# Patient Record
Sex: FEMALE | Race: WHITE
[De-identification: names, ages, dates, MRNs, and addresses within clinical notes are randomized per-mention and may not be internally consistent; named-entity substitution may affect disease eponyms.]

---

## 2019-12-04 ENCOUNTER — HOSPITAL ENCOUNTER (EMERGENCY)
Dept: HOSPITAL 75 - ER | Age: 24
Discharge: HOME | End: 2019-12-04
Payer: COMMERCIAL

## 2019-12-04 VITALS — WEIGHT: 293 LBS | BODY MASS INDEX: 43.4 KG/M2 | HEIGHT: 68.9 IN

## 2019-12-04 VITALS — DIASTOLIC BLOOD PRESSURE: 83 MMHG | SYSTOLIC BLOOD PRESSURE: 139 MMHG

## 2019-12-04 DIAGNOSIS — K59.00: ICD-10-CM

## 2019-12-04 DIAGNOSIS — K52.9: ICD-10-CM

## 2019-12-04 DIAGNOSIS — O16.1: ICD-10-CM

## 2019-12-04 DIAGNOSIS — Z87.891: ICD-10-CM

## 2019-12-04 DIAGNOSIS — Z77.22: ICD-10-CM

## 2019-12-04 DIAGNOSIS — O99.612: Primary | ICD-10-CM

## 2019-12-04 DIAGNOSIS — Z3A.14: ICD-10-CM

## 2019-12-04 LAB
ALBUMIN SERPL-MCNC: 3.7 GM/DL (ref 3.2–4.5)
ALP SERPL-CCNC: 56 U/L (ref 40–136)
ALT SERPL-CCNC: 18 U/L (ref 0–55)
BASOPHILS # BLD AUTO: 0 10^3/UL (ref 0–0.1)
BASOPHILS NFR BLD AUTO: 0 % (ref 0–10)
BILIRUB SERPL-MCNC: 0.3 MG/DL (ref 0.1–1)
BUN/CREAT SERPL: 6
CALCIUM SERPL-MCNC: 9.2 MG/DL (ref 8.5–10.1)
CHLORIDE SERPL-SCNC: 106 MMOL/L (ref 98–107)
CO2 SERPL-SCNC: 18 MMOL/L (ref 21–32)
CREAT SERPL-MCNC: 0.64 MG/DL (ref 0.6–1.3)
EOSINOPHIL # BLD AUTO: 0 10^3/UL (ref 0–0.3)
EOSINOPHIL NFR BLD AUTO: 0 % (ref 0–10)
ERYTHROCYTE [DISTWIDTH] IN BLOOD BY AUTOMATED COUNT: 13.9 % (ref 10–14.5)
GFR SERPLBLD BASED ON 1.73 SQ M-ARVRAT: > 60 ML/MIN
GLUCOSE SERPL-MCNC: 86 MG/DL (ref 70–105)
HCT VFR BLD CALC: 34 % (ref 35–52)
HGB BLD-MCNC: 11.4 G/DL (ref 11.5–16)
LIPASE SERPL-CCNC: 10 U/L (ref 8–78)
LYMPHOCYTES # BLD AUTO: 2 X 10^3 (ref 1–4)
LYMPHOCYTES NFR BLD AUTO: 23 % (ref 12–44)
MAGNESIUM SERPL-MCNC: 1.9 MG/DL (ref 1.6–2.4)
MANUAL DIFFERENTIAL PERFORMED BLD QL: NO
MCH RBC QN AUTO: 28 PG (ref 25–34)
MCHC RBC AUTO-ENTMCNC: 34 G/DL (ref 32–36)
MCV RBC AUTO: 81 FL (ref 80–99)
MONOCYTES # BLD AUTO: 0.7 X 10^3 (ref 0–1)
MONOCYTES NFR BLD AUTO: 7 % (ref 0–12)
NEUTROPHILS # BLD AUTO: 6.2 X 10^3 (ref 1.8–7.8)
NEUTROPHILS NFR BLD AUTO: 70 % (ref 42–75)
PLATELET # BLD: 275 10^3/UL (ref 130–400)
PMV BLD AUTO: 10.9 FL (ref 7.4–10.4)
POTASSIUM SERPL-SCNC: 3.8 MMOL/L (ref 3.6–5)
PROT SERPL-MCNC: 6.9 GM/DL (ref 6.4–8.2)
SODIUM SERPL-SCNC: 136 MMOL/L (ref 135–145)
WBC # BLD AUTO: 8.9 10^3/UL (ref 4.3–11)

## 2019-12-04 PROCEDURE — 85025 COMPLETE CBC W/AUTO DIFF WBC: CPT

## 2019-12-04 PROCEDURE — 80053 COMPREHEN METABOLIC PANEL: CPT

## 2019-12-04 PROCEDURE — 83735 ASSAY OF MAGNESIUM: CPT

## 2019-12-04 PROCEDURE — 36415 COLL VENOUS BLD VENIPUNCTURE: CPT

## 2019-12-04 PROCEDURE — 96374 THER/PROPH/DIAG INJ IV PUSH: CPT

## 2019-12-04 PROCEDURE — 96361 HYDRATE IV INFUSION ADD-ON: CPT

## 2019-12-04 PROCEDURE — 83690 ASSAY OF LIPASE: CPT

## 2019-12-04 NOTE — ED ABDOMINAL PAIN
General


Chief Complaint:  Abdominal/GI Problems


Stated Complaint:  CONSTIPATION,VOMITING, FEVER


Nursing Triage Note:  


SELVIN STATES THAT SHE HAS BEEN BEEN CONSTIPATED SINCE  THANKSGIVING. SHE IS 14




WEEKS PREGNANT. SHE HAS ALSO BEEN HAVING NAUSEA AND VOMITING AND FEVER .5.


Sepsis Screen:  No Definite Risk


Source of Information:  Patient, Other (BF)


Exam Limitations:  No Limitations





History of Present Illness


Date Seen by Provider:  Dec 4, 2019


Time Seen by Provider:  17:55


Initial Comments


Patient presents ER by private conveyance from Dr. Faulkner office with chief 

complaint she has not had a bowel movement since Thanksgiving, approximate 6 

days ago. For the past 3-4 days she's been having nausea with vomiting and 

yesterday she started expressing fever with a MAXIMUM TEMPERATURE of 101.5. She 

went to her OB provider Dr. Persaud and was given three quarters of a liter fluid 

and Zofran before being sent to the ER for further evaluation. She has no 

history of abdominal trauma, surgery or other medical problems besides 

hypertension for which she is usually on lisinopril but she was switched to the 

labetalol for the pregnancy. She is a G1 at 14 weeks 2 days. The Zofran did help

. She has no other medical allergies. She quit smoking about a month ago and 

does not use alcohol or recreational drugs. She's having no dysuria or blood in 

the vomitus. She has some upper abdominal pain associated with the vomiting. She

has not tried anything for the pain.





Allergies and Home Medications


Allergies


Coded Allergies:  


     No Known Drug Allergies (Unverified , 12/4/19)





Patient Home Medication List


Home Medication List Reviewed:  Yes





Review of Systems


Review of Systems


Constitutional:  No chills, No diaphoresis


EENTM:  No Blurred Vision, No Double Vision


Respiratory:  Denies Cough, Denies Shortness of Air


Cardiovascular:  Denies Chest Pain, Denies Edema


Gastrointestinal:  Denies Abdomen Distended; Abdominal Pain, Constipated; Denies

Diarrhea; Nausea, Poor Appetite, Poor Fluid Intake, Vomiting


Genitourinary:  Denies Burning, Denies Discharge


Musculoskeletal:  No back pain, No joint pain


Skin:  No pruritus, No rash


Psychiatric/Neurological:  Denies Headache, Denies Numbness





Past Medical-Social-Family Hx


Patient Social History


Alcohol Use:  Denies Use


Recreational Drug Use:  No


Smoking Status:  Former Smoker


2nd Hand Smoke Exposure:  Yes


Recent Foreign Travel:  No


Contact w/Someone Who Travel:  No


Recent Infectious Disease Expo:  No


Recent Hopitalizations:  No





Seasonal Allergies


Seasonal Allergies:  No





Past Medical History


Surgeries:  No


Respiratory:  No


Cardiac:  Yes


Hypertension


Genitourinary:  No


Gastrointestinal:  No


Musculoskeletal:  No


Endocrine:  No


HEENT:  No


Cancer:  No


Psychosocial:  No


Integumentary:  No


Blood Disorders:  No





Physical Exam


Vital Signs





Vital Signs - First Documented








 12/4/19





 17:22


 


Temp 36.7


 


Pulse 95


 


Resp 16


 


B/P (MAP) 139/83 (101)


 


Pulse Ox 99





Capillary Refill : Less Than 3 Seconds


Height/Weight/BMI


Height: '"


Weight: lbs. oz. kg; 47.00 BMI


Method:


General Appearance:  WD/WN, no apparent distress


HEENT:  PERRL/EOMI, normal ENT inspection, TMs normal, pharynx normal


Neck:  full range of motion, normal inspection


Respiratory:  lungs clear, normal breath sounds, no respiratory distress, no 

accessory muscle use


Cardiovascular:  normal peripheral pulses, regular rate, rhythm


Gastrointestinal:  normal bowel sounds, non tender, soft


Extremities:  normal range of motion, normal inspection, normal capillary refill


Neurologic/Psychiatric:  alert, normal mood/affect, oriented x 3


Skin:  normal color, warm/dry





Progress/Results/Core Measures


Results/Orders


Lab Results





Laboratory Tests








Test


 12/4/19


18:40 Range/Units


 


 


White Blood Count


 8.9 


 4.3-11.0


10^3/uL


 


Red Blood Count


 4.14 L


 4.35-5.85


10^6/uL


 


Hemoglobin 11.4 L 11.5-16.0  G/DL


 


Hematocrit 34 L 35-52  %


 


Mean Corpuscular Volume 81  80-99  FL


 


Mean Corpuscular Hemoglobin 28  25-34  PG


 


Mean Corpuscular Hemoglobin


Concent 34 


 32-36  G/DL





 


Red Cell Distribution Width 13.9  10.0-14.5  %


 


Platelet Count


 275 


 130-400


10^3/uL


 


Mean Platelet Volume 10.9 H 7.4-10.4  FL


 


Neutrophils (%) (Auto) 70  42-75  %


 


Lymphocytes (%) (Auto) 23  12-44  %


 


Monocytes (%) (Auto) 7  0-12  %


 


Eosinophils (%) (Auto) 0  0-10  %


 


Basophils (%) (Auto) 0  0-10  %


 


Neutrophils # (Auto) 6.2  1.8-7.8  X 10^3


 


Lymphocytes # (Auto) 2.0  1.0-4.0  X 10^3


 


Monocytes # (Auto) 0.7  0.0-1.0  X 10^3


 


Eosinophils # (Auto)


 0.0 


 0.0-0.3


10^3/uL


 


Basophils # (Auto)


 0.0 


 0.0-0.1


10^3/uL


 


Sodium Level 136  135-145  MMOL/L


 


Potassium Level 3.8  3.6-5.0  MMOL/L


 


Chloride Level 106    MMOL/L


 


Carbon Dioxide Level 18 L 21-32  MMOL/L


 


Anion Gap 12  5-14  MMOL/L


 


Blood Urea Nitrogen 4 L 7-18  MG/DL


 


Creatinine


 0.64 


 0.60-1.30


MG/DL


 


Estimat Glomerular Filtration


Rate > 60 


  





 


BUN/Creatinine Ratio 6   


 


Glucose Level 86    MG/DL


 


Calcium Level 9.2  8.5-10.1  MG/DL


 


Corrected Calcium 9.4  8.5-10.1  MG/DL


 


Magnesium Level 1.9  1.6-2.4  MG/DL


 


Total Bilirubin 0.3  0.1-1.0  MG/DL


 


Aspartate Amino Transf


(AST/SGOT) 19 


 5-34  U/L





 


Alanine Aminotransferase


(ALT/SGPT) 18 


 0-55  U/L





 


Alkaline Phosphatase 56    U/L


 


Total Protein 6.9  6.4-8.2  GM/DL


 


Albumin 3.7  3.2-4.5  GM/DL


 


Lipase 10  8-78  U/L








My Orders





Orders - SUNG CHANDLER


Ed Iv/Invasive Line Start (12/4/19 18:04)


Lactated Ringers (Lr 1000 Ml Iv Solution (12/4/19 18:04)


Promethazine Injection (Phenergan Injec (12/4/19 18:15)


Cbc With Automated Diff (12/4/19 18:04)


Comprehensive Metabolic Panel (12/4/19 18:04)


Lipase (12/4/19 18:04)


Magnesium (12/4/19 18:04)


Lidocaine 2% Viscous 15 Ml (Xylocaine Vi (12/4/19 18:15)


Famotidine Tablet (Pepcid Tablet) (12/4/19 18:04)


Antacid  Suspension (Mylanta  Suspension (12/4/19 18:15)





Medications Given in ED





Current Medications








 Medications  Dose


 Ordered  Sig/Micheline


 Route  Start Time


 Stop Time Status Last Admin


Dose Admin


 


 Al Hydrox/Mg


 Hydrox/Simethicone  30 ml  ONCE  ONCE


 PO  12/4/19 18:15


 12/4/19 18:16 DC 12/4/19 18:36


30 ML


 


 Lactated Ringer's  1,000 ml @ 


 0 mls/hr  Q0M ONCE


 IV  12/4/19 18:04


 12/4/19 18:06 DC 12/4/19 18:36


1,000 MLS/HR


 


 Lidocaine HCl  15 ml  ONCE  ONCE


 PO  12/4/19 18:15


 12/4/19 18:16 DC 12/4/19 18:36


15 ML


 


 Promethazine HCl  25 mg  ONCE  ONCE


 IVP  12/4/19 18:15


 12/4/19 18:16 DC 12/4/19 18:33


25 MG








Vital Signs/I&O











 12/4/19





 17:22


 


Temp 36.7


 


Pulse 95


 


Resp 16


 


B/P (MAP) 139/83 (101)


 


Pulse Ox 99














Blood Pressure Mean:                    101


POS








Progress


Progress Note #1:  


   Time:  18:14


Progress Note


We'll start some IV fluids Phenergan a GI cocktail and labs looking for more 

serious trouble. She has a benign abdominal exam with good bowel sounds all 4 

quadrants. Aseptic vital signs.


Progress Note #2:  


   Time:  19:21


Progress Note


The GI cocktail did seem to make improvement in her pain to where she just 

barely feels a dull ache in her epigastric region. She still has a benign 

abdominal exam with aseptic vitals. Her labs are reassuring. She is about 

prison through her fluid bolus which also contains or Phenergan. Her nausea is 

completely gone. Return to the Center home with some Zofran and instructions to 

clean out using MiraLAX and enemas. She already has MiraLAX from primary care. 

We'll also recommend some Pepcid for the next week.





Departure


Impression





   Primary Impression:  


   Obstipation


   Additional Impression:  


   Gastroenteritis


Disposition:  01 HOME, SELF-CARE


Condition:  Stable





Departure-Patient Inst.


Decision time for Depature:  19:23


Referrals:  


GALI PERSAUD MD (PCP/Family)


Primary Care Physician


Patient Instructions:  Constipation, Adult (DC), GASTROENTERITIS-6Y-ADULT





Add. Discharge Instructions:  


Use the Zofran 1 tablet under the tongue every 6 hours as needed for nausea.


Encourage lots of fluids.


MiraLAX 1-4 times a day until you have results.


Combine this with either a suppository or enema daily.


Tylenol 1000 mg every 8 hours as needed for pain.


Pepcid one capsule twice daily for the next week for stomach discomfort.


Follow-up with the obstetrician as necessary.


If you begin to have fevers, shortness of breath, intractable abdominal pain 

then please return to the nearest ER. 


All discharge instructions reviewed with patient and/or family. Voiced 

understanding.


Scripts


Ondansetron (Ondansetron Odt) 4 Mg Tab.rapdis


4 MG PO Q6H PRN for NAUSEA/VOMITING, #15 TAB 0 Refills


   Prov: SUNG CHANDLER         12/4/19 


Famotidine (Pepcid) 20 Mg Tablet


20 MG PO BID for 7 Days, #14 TAB 0 Refills


   Prov: SUNG CHANDLER         12/4/19


Work/School Note:  Work Release Form   Date Seen in the Emergency Department:  

Dec 4, 2019


   Return to Work:  Dec 7, 2019


   Restrictions:  No Restrictions











SUNG CHANDLER                  Dec 4, 2019 18:14


POS

## 2019-12-30 NOTE — XMS REPORT
[facility]

                             Created on: 10/23/2018



Zhane Hernandez

External Reference #: [id number]

: 1995

Sex: Female



Demographics





                          Address                   [address] [place] [address]  [address]

 

                          Preferred Language        Unknown

 

                          Marital Status            Unknown

 

                          Yarsani Affiliation     Unknown

 

                          Race                      Unknown

 

                          Ethnic Group              Unknown





Author





                          Author                    Zhane FRANKS

 

                          Organization              [facility]

 

                          Address                   [address] [place]  [address]



 

                          Phone                     [phone]







Care Team Providers





                    Care Team Member Name Role                Phone

 

                    ARCHIE FRANKS  Unavailable         [phone]







PROBLEMS

Unknown Problems



ALLERGIES

No Known Allergies



ENCOUNTERS





                Encounter       Location        Date            Diagnosis

 

                          [facility]     [address] [id number]

[address] [place] [id number]

                          18 Oct, 2018              Viral upper respiratory trac

t infection J06.9 and BMI 40.0-44.9, 

adult Z68.41

 

                          [facility]     [address] [facility] [id number]

[address] [place] [id number]

                          18 Oct, 2018               

 

                          [facility]     [address] [facility] [id number]

[address] [place] [phone]

                          21 Sep, 2018              Encounter for immunization Z

23







IMMUNIZATIONS

No Known Immunizations



SOCIAL HISTORY

Never Assessed



REASON FOR VISIT

Cold symptoms- Cough, congestion, cold sore on mouth, slight fever yesterday- TI Toure RN



PLAN OF CARE





                          Activity                  Details

 

                                         

 

                          Follow Up                 as needed  or reg fu with pc

p Reason:







VITAL SIGNS





                    Height              70 in               2018-10-18

 

                    Weight              304.2 lbs           2018-10-18

 

                    Temperature         98.7 degrees Fahrenheit 2018-10-18

 

                    Heart Rate          82 bpm              2018-10-18

 

                    Respiratory Rate    22                  2018-10-18

 

                    BMI                 43.64 kg/m2         2018-10-18

 

                    Blood pressure systolic 128 mmHg            2018-10-18

 

                    Blood pressure diastolic 74 mmHg             2018-10-18







MEDICATIONS





        Medication Instructions Dosage  Frequency Start Date End Date Duration S

tatus

 

        Acyclovir 800 MG Orally Three times a day 1 tablet 8h      18 Oct, 2018 

        2 days  

Active

 

             Pseudoephedrine HCl  MG Orally every 12 hrs 1 tablet as neede

d 12h          18 Oct,

2018                                    10 days             Active

 

           Benzonatate 200 mg Orally Three times a day 1 capsule  8h         18 

Oct, 2018 25 Oct, 

2018                      7 days                    Active







RESULTS

No Results



PROCEDURES

No Known procedures



INSTRUCTIONS





MEDICATIONS ADMINISTERED

No Known Medications



MEDICAL (GENERAL) HISTORY





                    Type                Description         Date

 

                    Medical History     Herpes zoster without complication  

 

                    Surgical History    No Surgical history information  

 

                    Hospitalization History Pneumonia

## 2019-12-30 NOTE — XMS REPORT
[facility]

                             Created on: 2019



Zhane Hernandez

External Reference #: [id number]

: 1995

Sex: Female



Demographics





                          Address                   [address] [place] [address]  [address]

 

                          Preferred Language        Unknown

 

                          Marital Status            Unknown

 

                          Orthodox Affiliation     Unknown

 

                          Race                      Unknown

 

                          Ethnic Group              Unknown





Author





                          Author                    Zhane FRANKS

 

                          [facility]

 

                          Address                   [address] [place]  [address]



 

                          Phone                     [phone]







Care Team Providers





                    Care Team Member Name Role                Phone

 

                    ARCHIE FRANKS  Unavailable         [phone]







PROBLEMS





          Type      Condition ICD9-CM Code NXC97-DY Code Onset Dates Condition S

tatus SNOMED 

Code

 

          Problem   Pure hypercholesterolemia           E78.00              Acti

ve    [id number]

 

          Problem   Hyperinsulinemia           E16.1               Active    [id number]

[id number]

 

          Problem   Depressive disorder           F32.9               Active    

[id number]

 

          Problem   Dysthymia           F34.1               Active    [id number]







ALLERGIES

No Known Allergies



ENCOUNTERS





                Encounter       Location        Date            Diagnosis

 

                          [place] [address] [phone]

                                        Hyperinsulinemia E16.1 and P

ure hypercholesterolemia E78.00

 

                          [facility]     [address] [phone]

                          30 May, 2019              Encounter for immunization Z

23

 

                          [place] [address] [phone]

                          24 May, 2019               

 

                          [facility]   [address] [id number]

[address] [place] [id number]

                          28 Mar, 2019              Caries K02.9 ; Periodontitis

 K05.30 and Dental examination Z01.20

 

                          [facility]     [address] [address]

                          20 Mar, 2019              Hyperinsulinemia E16.1 and P

ure hypercholesterolemia E78.00

 

                          [place] [address] [address]

                          20 Mar, 2019              Hirsutism L68.0

 

                          [place] [address] [phone]

                          11 Mar, 2019              Morbid obesity E66.01 ; Dyst

hymia F34.1 ; Pre-conception counseling

Z31.69 ; Weight loss counseling, encounter for Z71.3 and Hirsutism L68.0

 

                          [facility]     [address] [facility] [id number]

[address] [place] [phone]

                          20 2019               

 

                          [facility]   [address] [id number]

[address] [place] [id number]

                          15 2019              Dental examination Z01.20 an

d Caries K02.9

 

                          [facility]     [address] [id number]

[address] [place] [phone]

                          14 2019              Dental abscess K04.7

 

                          [place] [address] [facility] [id number]

[address] [place] [phone]

                          14 2019              Dental examination Z01.20

 

                          [place] [address] [facility] [id number]

[address] [place] [phone]

                          18 2019               

 

                          [facility]     [address] [facility] [id number]

[address] [place] [phone]

                          18 Oct, 2018              Viral upper respiratory trac

t infection J06.9 and BMI 40.0-44.9, 

adult Z68.41

 

                          [facility]     [address] [facility] [id number]

[address] [place] [phone]

                          18 Oct, 2018               

 

                          [place] [address] [facility] [id number]

[address] [place] [phone]

                          21 Sep, 2018              Encounter for immunization Z

23







IMMUNIZATIONS

No Known Immunizations



SOCIAL HISTORY

Never Assessed



REASON FOR VISIT

est care, would like to discuss weight loss medication - INEZ Langley



PLAN OF CARE





                          Activity                  Details

 

                                         

 

                          Follow Up                 4 Weeks Reason:wt check







VITAL SIGNS





                    Height              70 in               2019

 

                    Weight              320.4 lbs           2019

 

                    Temperature         98.1 degrees Fahrenheit 2019

 

                    Heart Rate          98 bpm              2019

 

                    Respiratory Rate    18                  2019

 

                    Oximetry            100 %               2019

 

                    BMI                 45.97 kg/m2         2019

 

                    Blood pressure systolic 142 mmHg            2019

 

                    Blood pressure diastolic 78 mmHg             2019







MEDICATIONS





        Medication Instructions Dosage  Frequency Start Date End Date Duration S

vania

 

        Sertraline HCl 50 mg Orally Once a day 1 tablet 24h     11 Mar, 2019    

     30 day(s) 

Active

 

        Phentermine HCl 37.5 mg Orally Once a day 1 tablet 24h     11 Mar, 2019 

        28 days 

Active







RESULTS

No Results



PROCEDURES





                Procedure       Date Ordered    Result          Body Site

 

                COMPLETE CBC W/AUTO DIFF WBC 2019                   

 

                VENIPUNCT, ROUTINE* 2019                   

 

                LIPID PANEL     2019                   

 

                COMPREHEN METABOLIC PANEL 2019                   

 

                ASSAY OF INSULIN 2019                   

 

                GONADOTROPIN (LH) 2019                   

 

                GONADOTROPIN (FSH) 2019                   

 

                ASSAY OF ESTRADIOL 2019                   

 

                ASSAY OF FREE THYROXINE 2019                   

 

                ASSAY THYROID STIM HORMONE 2019                   







INSTRUCTIONS





MEDICATIONS ADMINISTERED

No Known Medications



MEDICAL (GENERAL) HISTORY





                    Type                Description         Date

 

                    Medical History     Herpes zoster without complication  

 

                    Surgical History    tubes in ears as a child  

 

                    Surgical History    adenoids removed     

 

                    Hospitalization History Pneumonia

## 2019-12-30 NOTE — XMS REPORT
Continuity of Care Document

                             Created on: 2019



Zhane Hernandez

External Reference #: [id number]

: 1995

Sex: Female



Demographics





                          Address                   [address]  [address]

 

                          Home Phone                [phone] x

 

                          Preferred Language        Unknown

 

                          Marital Status            Unknown

 

                          Confucianist Affiliation     Unknown

 

                          Race                      Unknown

 

                          Ethnic Group              Unknown





Author





                          Organization              Unknown

 

                          Address                   Unknown

 

                          Phone                     Unavailable



              



Allergies

      



             Active           Description           Code           Type         

  Severity   

                Reaction           Onset           Reported/Identified          

 

Relationship to Patient                 Clinical Status        

 

                Yes             No Known Drug Allergies           [id number]    

       Drug 

Allergy           Unknown           N/A                             2019  

      

                                                             



                  



Medications

      



There is no data.                  



Problems

      



             Date Dx Coded           Attending           Type           Code    

       

Diagnosis                               Diagnosed By        

 

             2019           SUNG CHANDLER MD           Ot           K52.

9           

NONINFECTIVE GASTROENTERITIS AND COLITIS                    

 

             2019           SUNG CHANDLER MD           Ot           K59.

00           

CONSTIPATION, UNSPECIFIED                        

 

             2019           SUNG CHANDLER MD           Ot           O16.

1           

UNSPECIFIED MATERNAL HYPERTENSION, FIRST                    

 

                2019           SUNG CHANDLER MD           Ot            

  O99.612          

                          DISEASES OF THE DGSTV SYS COMP PREGNANCY              

      

 

             2019           SUNG CHANDLER MD           Ot           R50.

9           

FEVER, UNSPECIFIED                               

 

             2019           SUNG CHANDLER MD           Ot           Z3A.

14           

14 WEEKS GESTATION OF PREGNANCY                    

 

             2019           SUNG CHANDLER MD           Ot           Z77.

22           

CNTCT W AND EXPSR TO ENVIRON TOBACCO SMO                    

 

                2019           SUNG CHANLDER MD           Ot            

  Z87.891          

                          PERSONAL HISTORY OF NICOTINE DEPENDENCE               

     



                                



Procedures

      



There is no data.                  



Results

      



                    Test                Result              Range        

 

                                        ESTRADIOL - 19 09:24         

 

                    ESTRADIOL           81 pg/mL            NRG        

 

                                        FSH, SERUM - 19 09:24         

 

                    FSH                 4.4 mIU/mL           NRG        

 

                                        LH - 19 09:24         

 

                    LH                  5.1 mIU/mL           NRG        

 

                                        INSULIN LEVEL - 19 09:24         

 

                    INSULIN             24.0 uIU/mL            2.0-19.6        

 

                                        TESTOSTERONE, TOTAL (WOMEN, CHILDREN, HY

POGONADAL MALES) - 19 10:45       

 

 

                    TESTOSTERONE, TOTAL, LC/MS/MS           15 ng/dL            

2-45        

 

                    FREE TESTOSTERONE           2.6 pg/mL           0.1-6.4     

   

 

                                        LIPID PANEL - 19 09:05         

 

                    CHOLESTEROL, TOTAL           200 mg/dL           <200       

 

 

                    HDL CHOLESTEROL           38 mg/dL            >50        

 

                    TRIGLYCERIDES           124 mg/dL           <150        

 

                    LDL-CHOLESTEROL           137 mg/dL (calc)           NRG    

    

 

                    CHOL/HDLC RATIO           5.3 (calc)           <5.0        

 

                    NON HDL CHOLESTEROL           162 mg/dL (calc)           <13

0        

 

                                        INSULIN LEVEL - 19 09:07         

 

                    INSULIN             15.3 uIU/mL            2.0-19.6        

 

                                        TSH w/ FREE T4 - 19 08:24         

 

                    TSH                 2.06 mIU/L           NRG        

 

                    T4, FREE            1.3 ng/dL           0.8-1.8        

 

                                        CMP - 19 08:24         

 

                    GLUCOSE             99 mg/dL            65-99        

 

                    UREA NITROGEN (BUN)           9 mg/dL             7-25      

  

 

                    CREATININE           0.72 mg/dL           0.50-1.10        

 

                    eGFR NON-AFR. AMERICAN           119 mL/min/1.73m2          

 > OR = 60        

 

                    eGFR            138 mL/min/1.73m2           

> OR = 60        

 

                    BUN/CREATININE RATIO           NOT APPLICABLE (calc)        

   6-22        

 

                    SODIUM              136 mmol/L           135-146        

 

                    POTASSIUM           4.5 mmol/L           3.5-5.3        

 

                    CHLORIDE            104 mmol/L                   

 

                    CARBON DIOXIDE           24 mmol/L           20-32        

 

                    CALCIUM             9.1 mg/dL           8.6-10.2        

 

                    PROTEIN, TOTAL           6.9 g/dL            6.1-8.1        

 

                    ALBUMIN             4.0 g/dL            3.6-5.1        

 

                    GLOBULIN            2.9 g/dL (calc)           1.9-3.7       

 

 

                    ALBUMIN/GLOBULIN RATIO           1.4 (calc)           1.0-2.

5        

 

                    BILIRUBIN, TOTAL           0.3 mg/dL           0.2-1.2      

  

 

                    ALKALINE PHOSPHATASE           62 U/L                 

     

 

                    AST                 14 U/L              10-30        

 

                    ALT                 13 U/L              6-29        

 

                                        INSULIN LEVEL - 19 08:24         

 

                    INSULIN             20.8 uIU/mL            2.0-19.6        

 

                                        ANTIBODY SCREEN - 10/09/19 14:40        

 

 

                          ANTIBODY SCREEN, RBC W/REFL ID, TITER AND AG          

 NO ANTIBODIES DETECTED   

                                        NRG        

 

                                        TSH - 10/09/19 14:40         

 

                    TSH                 1.81 mIU/L           NRG        

 

                                        RUBELLA IMMUNE STATUS - 10/09/19 14:40  

       

 

                    RUBELLA ANTIBODY (IGG)           1.41 index           NRG   

     

 

                                        CULTURE, URINE - 10/09/19 14:40         

 

                    CULTURE, URINE, ROUTINE           SEE NOTE            NRG   

     

 

                                        SUREPATH PAP AND HPV mRNA E6/E7 - 10/09/

19 14:40         

 

                    CLINICAL INFORMATION:                               NRG     

   

 

                    LMP:                                    NRG        

 

                    PREV. PAP:                               NRG        

 

                    PREV. BX:           CX                  NRG        

 

                    SOURCE:             [employer]              NRG        

 

                    STATEMENT OF ADEQUACY:                               NRG    

    

 

                    INTERPRETATION/RESULT:                               NRG    

    

 

                    CYTOTECHNOLOGIST:                               NRG        

 

                    HPV mRNA E6/E7, SUREPATH VIAL           Not Detected        

    NOT DETECTED    

   

 

                    REVIEW CYTOTECHNOLOGIST:                               NRG  

      

 

                    COMMENT                                 NRG        

 

                                        URINE PROTEIN 24 HOUR - 10/15/19 08:20  

       

 

                    PROTEIN, TOTAL, 24 HR UR           156 mg/24 h           <15

0        

 

                                        QNATAL - 19 10:27         

 

                    NUMBER OF FETUSES?           1                   NRG        

 

                    ADVANCED MATERNAL AGE?           NO                  NRG    

    

 

                    ABNORMAL CASSIA?           NO                  NRG        

 

                    ABNORMAL US?           NO                  NRG        

 

                    PERSONAL/FAM HISTORY?           NO                  NRG     

   

 

                    INTERPRETATION           TNP                 NRG        

 

                                        Complete blood count (CBC) with automate

d white blood cell (WBC) differential - 

19 18:40         

 

                          Blood leukocytes automated count (number/volume)      

     8.9 10*3/uL          

                                        4.3-11.0        

 

                          Blood erythrocytes automated count (number/volume)    

       4.14 10*6/uL       

                                        4.35-5.85        

 

                    Venous blood hemoglobin measurement (mass/volume)           

11.4 g/dL           

11.5-16.0        

 

                    Blood hematocrit (volume fraction)           34 %           

     35-52        

 

                    Automated erythrocyte mean corpuscular volume           81 [

foz_us]           

80-99        

 

                                        Automated erythrocyte mean corpuscular h

emoglobin (mass per erythrocyte)        

                          28 pg                     25-34        

 

                                        Automated erythrocyte mean corpuscular h

emoglobin concentration measurement 

(mass/volume)             34 g/dL                   32-36        

 

                    Automated erythrocyte distribution width ratio           13.

9 %              10.0-

14.5        

 

                    Automated blood platelet count (count/volume)           275 

10*3/uL           

130-400        

 

                          Automated blood platelet mean volume measurement      

     10.9 [foz_us]        

                                        7.4-10.4        

 

                    Automated blood neutrophils/100 leukocytes           70 %   

             42-75       

 

 

                    Automated blood lymphocytes/100 leukocytes           23 %   

             12-44       

 

 

                    Blood monocytes/100 leukocytes           7 %                

 0-12        

 

                    Automated blood eosinophils/100 leukocytes           0 %    

             0-10        

 

                    Automated blood basophils/100 leukocytes           0 %      

           0-10        

 

                    Blood neutrophils automated count (number/volume)           

6.2 10*3            

1.8-7.8        

 

                    Blood lymphocytes automated count (number/volume)           

2.0 10*3            

1.0-4.0        

 

                    Blood monocytes automated count (number/volume)           0.

7 10*3            

0.0-1.0        

 

                    Automated eosinophil count           0.0 10*3/uL           0

.0-0.3        

 

                    Automated blood basophil count (count/volume)           0.0 

10*3/uL           

0.0-0.1        

 

                                        Comprehensive metabolic panel - 19

 18:40         

 

                          Serum or plasma sodium measurement (moles/volume)     

      136 mmol/L          

                                        135-145        

 

                          Serum or plasma potassium measurement (moles/volume)  

         3.8 mmol/L       

                                        3.6-5.0        

 

                          Serum or plasma chloride measurement (moles/volume)   

        106 mmol/L        

                                                

 

                    Carbon dioxide           18 mmol/L           21-32        

 

                          Serum or plasma anion gap determination (moles/volume)

           12 mmol/L      

                                        5-14        

 

                          Serum or plasma urea nitrogen measurement (mass/volume

)           4 mg/dL       

                                        7-18        

 

                          Serum or plasma creatinine measurement (mass/volume)  

         0.64 mg/dL       

                                        0.60-1.30        

 

                    Serum or plasma urea nitrogen/creatinine mass ratio         

  6                   NRG  

      

 

                                        Serum or plasma creatinine measurement w

ith calculation of estimated glomerular 

filtration rate           >                         NRG        

 

                    Serum or plasma glucose measurement (mass/volume)           

86 mg/dL            

        

 

                    Serum or plasma calcium measurement (mass/volume)           

9.2 mg/dL           

8.5-10.1        

 

                          Serum or plasma total bilirubin measurement (mass/volu

me)           0.3 mg/dL   

                                        0.1-1.0        

 

                                        Serum or plasma alkaline phosphatase mikael

surement (enzymatic activity/volume)    

                          56 U/L                            

 

                                        Serum or plasma aspartate aminotransfera

se measurement (enzymatic 

activity/volume)           19 U/L                    5-34        

 

                                        Serum or plasma alanine aminotransferase

 measurement (enzymatic activity/volume)

                          18 U/L                    0-55        

 

                    Serum or plasma protein measurement (mass/volume)           

6.9 g/dL            

6.4-8.2        

 

                    Serum or plasma albumin measurement (mass/volume)           

3.7 g/dL            

3.2-4.5        

 

                    CALCIUM CORRECTED           9.4 mg/dL           8.5-10.1    

    

 

                                        Magnesium - 19 18:40         

 

                    Magnesium           1.9 mg/dL           1.6-2.4        

 

                                        Lipase - 19 18:40         

 

                    Lipase              10 U/L              8-78        

 

                                        PENTA SCREEN - 19 15:12         

 

                    Maternal Weight           319 lbs             NRG        

 

                    Est'd Date of Delivery           2020            NRG  

      

 

                    THONG Determined by           ULTRASOUND            NRG       

 

 

                    Mother's Ethnic Origin                       NRG   

     

 

                    Number of Fetuses           1                   NRG        

 

                    Insulin Depend Diabetic           NO                  NRG   

     

 

                    Repeat Specimen           NO                  NRG        

 

                    Hx Of Neural Tube Defects           NO                  NRG 

       

 

                    Prev Pregnancy Down Synd           NO                  NRG  

      

 

                    Donor Egg           NO                  NRG        

 

                    Donor Age: Egg Retrieval           NOT GIVEN            NRG 

       

 

                    Cigarette smoker           NOT GIVEN            NRG        

 

                    INTERPRETATION:           SEE NOTE            NRG        

 

                    Risk for ONTD           <1:5000             NRG        

 

                    Age Risk Down Syndrome           1:1133              NRG    

    

 

                    CASSIA Down Syndrome Risk           <1:5000             <1:270 

       

 

                    CASSIA Trisomy 18 Risk           <1:5000             <1:100    

    

 

                    Calc'd Gestational Age           16.1                NRG    

    

 

                    AFP, Serum           25.8 ng/mL           NRG        

 

                    AFP MoM             1.14                NRG        

 

                    hCG, Serum           11.4 IU/mL           NRG        

 

                    hCG MoM             0.47                NRG        

 

                    Estriol, Free           0.67 ng/mL           NRG        

 

                    Estriol MoM           1.04                NRG        

 

                    Inhibin A, Dimeric           87 pg/mL            NRG        

 

                    Inhibin A MoM           0.67                NRG        

 

                    h-hCG, Serum           7.5 mcg/L           NRG        

 

                    h-hCG MoM           0.37                NRG        

 

                    Date of Birth           1995            NRG        

 

                    Collection Date           2019            NRG        



                                                          



Encounters

      



                ACCT No.           Visit Date/Time           Discharge          

 Status         

             Pt. Type           Provider           Facility           Loc./Unit 

          

Complaint        

 

                [id number]           2019 13:20:00           2019 23:59:

59           CLS

                Outpatient           ARCHIE FRANKS                        

   [facility]                                  

 

             [id number]           2019 13:20:00                              

       Document

 Registration                                                                   

 

 

             [id number]           2019 09:00:00                              

       Document

 Registration                                                                   

 

 

             [id number]           10/15/2019 08:20:00                              

       Document

 Registration                                                                   

 

 

             [id number]           10/09/2019 13:20:00                              

       Document

 Registration                                                                   

 

 

             [id number]           2019 09:20:00                              

       Document

 Registration                                                                   

 

 

             [id number]           2019 09:00:00                              

       Document

 Registration                                                                   

 

 

             [id number]           2019 10:40:00                              

       Document

 Registration                                                                   

 

 

             [id number]           2019 08:20:00                              

       Document

 Registration                                                                   

 

 

                    [id number]           2019 17:13:00            19:34:00        

                DIS             Outpatient           RAMESH PERRY, SUNG KEVIN         

  [facility]           ER                        CONSTIPATION,VOMITING, 

FEVER

## 2020-04-10 ENCOUNTER — HOSPITAL ENCOUNTER (OUTPATIENT)
Dept: HOSPITAL 75 - WSO | Age: 25
Discharge: HOME | End: 2020-04-10
Attending: FAMILY MEDICINE
Payer: COMMERCIAL

## 2020-04-10 VITALS — SYSTOLIC BLOOD PRESSURE: 141 MMHG | DIASTOLIC BLOOD PRESSURE: 65 MMHG

## 2020-04-10 VITALS — DIASTOLIC BLOOD PRESSURE: 65 MMHG | SYSTOLIC BLOOD PRESSURE: 138 MMHG

## 2020-04-10 VITALS — BODY MASS INDEX: 43.4 KG/M2 | WEIGHT: 293 LBS | HEIGHT: 68.9 IN

## 2020-04-10 VITALS — SYSTOLIC BLOOD PRESSURE: 132 MMHG | DIASTOLIC BLOOD PRESSURE: 64 MMHG

## 2020-04-10 DIAGNOSIS — O16.3: Primary | ICD-10-CM

## 2020-04-10 DIAGNOSIS — O12.03: ICD-10-CM

## 2020-04-10 DIAGNOSIS — Z3A.32: ICD-10-CM

## 2020-04-10 LAB
ALBUMIN SERPL-MCNC: 3.4 GM/DL (ref 3.2–4.5)
ALP SERPL-CCNC: 79 U/L (ref 40–136)
ALT SERPL-CCNC: 7 U/L (ref 0–55)
BILIRUB SERPL-MCNC: 0.1 MG/DL (ref 0.1–1)
BUN/CREAT SERPL: 11
CALCIUM SERPL-MCNC: 9.1 MG/DL (ref 8.5–10.1)
CHLORIDE SERPL-SCNC: 106 MMOL/L (ref 98–107)
CO2 SERPL-SCNC: 18 MMOL/L (ref 21–32)
CREAT SERPL-MCNC: 0.66 MG/DL (ref 0.6–1.3)
CREAT UR-MCNC: 64 MG/DL (ref 30–125)
ERYTHROCYTE [DISTWIDTH] IN BLOOD BY AUTOMATED COUNT: 14.1 % (ref 10–14.5)
GFR SERPLBLD BASED ON 1.73 SQ M-ARVRAT: > 60 ML/MIN
GLUCOSE SERPL-MCNC: 88 MG/DL (ref 70–105)
HCT VFR BLD CALC: 32 % (ref 35–52)
HGB BLD-MCNC: 10.5 G/DL (ref 11.5–16)
MCH RBC QN AUTO: 27 PG (ref 25–34)
MCHC RBC AUTO-ENTMCNC: 33 G/DL (ref 32–36)
MCV RBC AUTO: 82 FL (ref 80–99)
PLATELET # BLD: 281 10^3/UL (ref 130–400)
PMV BLD AUTO: 11.1 FL (ref 7.4–10.4)
POTASSIUM SERPL-SCNC: 4.2 MMOL/L (ref 3.6–5)
PROT SERPL-MCNC: 6.7 GM/DL (ref 6.4–8.2)
PROT UR-MCNC: 11 MG/DL (ref 6–12)
SODIUM SERPL-SCNC: 135 MMOL/L (ref 135–145)
URATE SERPL-MCNC: 4.5 MG/DL (ref 2.6–7.2)
WBC # BLD AUTO: 13.9 10^3/UL (ref 4.3–11)

## 2020-04-10 PROCEDURE — 99213 OFFICE O/P EST LOW 20 MIN: CPT

## 2020-04-10 PROCEDURE — 83615 LACTATE (LD) (LDH) ENZYME: CPT

## 2020-04-10 PROCEDURE — 84156 ASSAY OF PROTEIN URINE: CPT

## 2020-04-10 PROCEDURE — 85027 COMPLETE CBC AUTOMATED: CPT

## 2020-04-10 PROCEDURE — 84550 ASSAY OF BLOOD/URIC ACID: CPT

## 2020-04-10 PROCEDURE — 82570 ASSAY OF URINE CREATININE: CPT

## 2020-04-10 PROCEDURE — 36415 COLL VENOUS BLD VENIPUNCTURE: CPT

## 2020-04-10 PROCEDURE — 80053 COMPREHEN METABOLIC PANEL: CPT

## 2020-04-10 NOTE — NUR
This RN gives update on pt report to Dr Rivera at this time. BPs read to Dr Rivera. Pt reports 
no s/s of Pre-E at this time. Dr Rivera says pt may DC to home now; wants pt to follow up on 
monday in clinic for BP check.

## 2020-04-10 NOTE — NUR
This RN calls Dr Rivera with pt report. current BP, denies PreE s/s, lab results reported. Pt 
may eat lunch, BP hourly.

## 2020-04-10 NOTE — NUR
VELIA YEPEZ presented to unit via ambulatory from ED, with c/o HIGH BLOOD PRESSURE- 
sent from Dr Rivera's office. VELIA YEPEZ weighed, gowned, voided, and to bed.  EFHM 
and TOCO applied, VS taken.  VELIA YEPEZ oriented to bed controls, call light, TV, 
heat, and A/C controls.

## 2020-04-13 NOTE — PHYSICIAN QUERY-FINAL DX
DESHAWN MAGANA 4/13/20 0836:


Clinic Account Progress/Dx


Physician Query:


Please give diagnosis





Please include # weeks gestation


Date of Service





Apr 10, 2020 at 10:30





GALI PERSAUD MD 4/13/20 2048:


Clinic Account Progress/Dx


DIAGNOSIS:


Diagnosis


Chronic hypertension with acute elevation and edema, negative labwork for 

preeclampsia, BP improved during observation period.


32 weeks gestation











DESHAWN MAGANA                    Apr 13, 2020 08:36


GALI PERSAUD MD             Apr 13, 2020 20:48

## 2020-04-21 ENCOUNTER — HOSPITAL ENCOUNTER (OUTPATIENT)
Dept: HOSPITAL 75 - WSO | Age: 25
Discharge: HOME | End: 2020-04-21
Attending: FAMILY MEDICINE
Payer: COMMERCIAL

## 2020-04-21 VITALS — BODY MASS INDEX: 43.4 KG/M2 | WEIGHT: 293 LBS | HEIGHT: 68.9 IN

## 2020-04-21 VITALS — DIASTOLIC BLOOD PRESSURE: 66 MMHG | SYSTOLIC BLOOD PRESSURE: 138 MMHG

## 2020-04-21 VITALS — DIASTOLIC BLOOD PRESSURE: 61 MMHG | SYSTOLIC BLOOD PRESSURE: 137 MMHG

## 2020-04-21 VITALS — SYSTOLIC BLOOD PRESSURE: 138 MMHG | DIASTOLIC BLOOD PRESSURE: 66 MMHG

## 2020-04-21 VITALS — DIASTOLIC BLOOD PRESSURE: 58 MMHG | SYSTOLIC BLOOD PRESSURE: 125 MMHG

## 2020-04-21 VITALS — DIASTOLIC BLOOD PRESSURE: 60 MMHG | SYSTOLIC BLOOD PRESSURE: 139 MMHG

## 2020-04-21 VITALS — SYSTOLIC BLOOD PRESSURE: 130 MMHG | DIASTOLIC BLOOD PRESSURE: 62 MMHG

## 2020-04-21 VITALS — SYSTOLIC BLOOD PRESSURE: 142 MMHG | DIASTOLIC BLOOD PRESSURE: 68 MMHG

## 2020-04-21 VITALS — DIASTOLIC BLOOD PRESSURE: 60 MMHG | SYSTOLIC BLOOD PRESSURE: 131 MMHG

## 2020-04-21 DIAGNOSIS — O16.9: Primary | ICD-10-CM

## 2020-04-21 DIAGNOSIS — Z3A.34: ICD-10-CM

## 2020-04-21 LAB
ALBUMIN SERPL-MCNC: 3.2 GM/DL (ref 3.2–4.5)
ALP SERPL-CCNC: 78 U/L (ref 40–136)
ALT SERPL-CCNC: 9 U/L (ref 0–55)
BASOPHILS # BLD AUTO: 0 10^3/UL (ref 0–0.1)
BASOPHILS NFR BLD AUTO: 0 % (ref 0–10)
BILIRUB SERPL-MCNC: 0.2 MG/DL (ref 0.1–1)
BUN/CREAT SERPL: 9
CALCIUM SERPL-MCNC: 9 MG/DL (ref 8.5–10.1)
CHLORIDE SERPL-SCNC: 106 MMOL/L (ref 98–107)
CO2 SERPL-SCNC: 17 MMOL/L (ref 21–32)
CREAT SERPL-MCNC: 0.67 MG/DL (ref 0.6–1.3)
CREAT UR-MCNC: 68 MG/DL (ref 30–125)
EOSINOPHIL # BLD AUTO: 0.1 10^3/UL (ref 0–0.3)
EOSINOPHIL NFR BLD AUTO: 1 % (ref 0–10)
ERYTHROCYTE [DISTWIDTH] IN BLOOD BY AUTOMATED COUNT: 14.6 % (ref 10–14.5)
GFR SERPLBLD BASED ON 1.73 SQ M-ARVRAT: > 60 ML/MIN
GLUCOSE SERPL-MCNC: 103 MG/DL (ref 70–105)
HCT VFR BLD CALC: 31 % (ref 35–52)
HGB BLD-MCNC: 10.5 G/DL (ref 11.5–16)
LYMPHOCYTES # BLD AUTO: 1.9 X 10^3 (ref 1–4)
LYMPHOCYTES NFR BLD AUTO: 16 % (ref 12–44)
MANUAL DIFFERENTIAL PERFORMED BLD QL: NO
MCH RBC QN AUTO: 28 PG (ref 25–34)
MCHC RBC AUTO-ENTMCNC: 33 G/DL (ref 32–36)
MCV RBC AUTO: 82 FL (ref 80–99)
MONOCYTES # BLD AUTO: 0.6 X 10^3 (ref 0–1)
MONOCYTES NFR BLD AUTO: 5 % (ref 0–12)
NEUTROPHILS # BLD AUTO: 9.5 X 10^3 (ref 1.8–7.8)
NEUTROPHILS NFR BLD AUTO: 79 % (ref 42–75)
PLATELET # BLD: 284 10^3/UL (ref 130–400)
PMV BLD AUTO: 11.1 FL (ref 7.4–10.4)
POTASSIUM SERPL-SCNC: 4.1 MMOL/L (ref 3.6–5)
PROT SERPL-MCNC: 6.7 GM/DL (ref 6.4–8.2)
PROT UR-MCNC: 10 MG/DL (ref 6–12)
SODIUM SERPL-SCNC: 136 MMOL/L (ref 135–145)
URATE SERPL-MCNC: 5.1 MG/DL (ref 2.6–7.2)
WBC # BLD AUTO: 12 10^3/UL (ref 4.3–11)

## 2020-04-21 PROCEDURE — 36415 COLL VENOUS BLD VENIPUNCTURE: CPT

## 2020-04-21 PROCEDURE — 76805 OB US >/= 14 WKS SNGL FETUS: CPT

## 2020-04-21 PROCEDURE — 80053 COMPREHEN METABOLIC PANEL: CPT

## 2020-04-21 PROCEDURE — 82570 ASSAY OF URINE CREATININE: CPT

## 2020-04-21 PROCEDURE — 76705 ECHO EXAM OF ABDOMEN: CPT

## 2020-04-21 PROCEDURE — 76819 FETAL BIOPHYS PROFIL W/O NST: CPT

## 2020-04-21 PROCEDURE — 99213 OFFICE O/P EST LOW 20 MIN: CPT

## 2020-04-21 PROCEDURE — 83615 LACTATE (LD) (LDH) ENZYME: CPT

## 2020-04-21 PROCEDURE — 84156 ASSAY OF PROTEIN URINE: CPT

## 2020-04-21 PROCEDURE — 96372 THER/PROPH/DIAG INJ SC/IM: CPT

## 2020-04-21 PROCEDURE — 85025 COMPLETE CBC W/AUTO DIFF WBC: CPT

## 2020-04-21 PROCEDURE — 84550 ASSAY OF BLOOD/URIC ACID: CPT

## 2020-04-21 NOTE — NUR
VELIA YEPEZ presented to unit via personal vehicle/amulatory from Dr Rivera's office, 
with c/o HIGH BP. VELIA YEPEZ weighed, gowned, voided, and to bed.  EFHM and TOCO 
applied, VS taken.  VELIA YEPEZ oriented to bed controls, call light, TV, heat, and 
A/C controls all by Alana SCHNEIDER.

## 2020-04-21 NOTE — NUR
This RN calls Dr Rivera with pt report. current BP's, pt CO headache (pt states she always 
has a headache but now is worse than usual), epigastric pain. +1 edema in legs/feet, +1 
reflexes, lab results read. Report from US tech to RN: BPP 8/8, gal stones present, slightly 
enlarged liver, measuring large for gestational age at 37 weeks and some change. Dr Rivera 
states labs look okay but  wants to continue to watch pt a little longer just in case BPs 
spike again (were high in office), RN will call back about 1300 with updated report. Dr Rivera is placing order for TUMS to help alleviate epigastric pain.

## 2020-04-21 NOTE — DIAGNOSTIC IMAGING REPORT
INDICATION: Maternal hypertension.



TECHNIQUE: Multiple real-time grayscale images were obtained over

the gravid uterus.



COMPARISON: None



FINDINGS: Limited imaging of the gravid uterus was performed.

Biophysical profile was also performed and there is a score of

8/8 as the fetus was given a score of 2 for fetal breathing

movements, motion, tone and amniotic fluid. Fetus is in cephalic

presentation. BRIDGET measures 12.1 cm. Placenta is anterior in

position without previa.



Biometrical measurements are as follows:

Biparietal 9.28 cm, age 37 weeks 6 days.

Head circumference 33.33 cm, age 38 weeks 1 days.

Abdominal circumference 31.83 cm, age 35 weeks 6 days.

Femur length 6.92 cm, age 35 weeks 4 days.



Sonographic estimate age: 36 weeks 6 days.

Sonographic estimated date of delivery: 05/13/2020.



Estimated Fetal Weight: 2861 gm (+/- 418  gm).

LMP percentile: 93%.



Fetal heart rate: 135 beats per minute.



Fetal number: 1 of 1.



IMPRESSION: 

1. Normal biophysical profile.

2. Cephalic presentation with estimated gestational age of 36

weeks and 6 days.



Dictated by: 



  Dictated on workstation # [id number]

## 2020-04-22 ENCOUNTER — HOSPITAL ENCOUNTER (OUTPATIENT)
Dept: HOSPITAL 75 - LDRP | Age: 25
Discharge: HOME | End: 2020-04-22
Attending: FAMILY MEDICINE
Payer: COMMERCIAL

## 2020-04-22 VITALS — DIASTOLIC BLOOD PRESSURE: 65 MMHG | SYSTOLIC BLOOD PRESSURE: 122 MMHG

## 2020-04-22 VITALS — HEIGHT: 68.9 IN | WEIGHT: 293 LBS | BODY MASS INDEX: 43.4 KG/M2

## 2020-04-22 VITALS — DIASTOLIC BLOOD PRESSURE: 80 MMHG | SYSTOLIC BLOOD PRESSURE: 138 MMHG

## 2020-04-22 VITALS — SYSTOLIC BLOOD PRESSURE: 138 MMHG | DIASTOLIC BLOOD PRESSURE: 80 MMHG

## 2020-04-22 DIAGNOSIS — Z3A.00: ICD-10-CM

## 2020-04-22 DIAGNOSIS — O26.899: Primary | ICD-10-CM

## 2020-04-22 LAB
ALBUMIN SERPL-MCNC: 3.2 GM/DL (ref 3.2–4.5)
ALP SERPL-CCNC: 85 U/L (ref 40–136)
ALT SERPL-CCNC: 10 U/L (ref 0–55)
BASOPHILS # BLD AUTO: 0 10^3/UL (ref 0–0.1)
BASOPHILS NFR BLD AUTO: 0 % (ref 0–10)
BILIRUB SERPL-MCNC: 0.2 MG/DL (ref 0.1–1)
BUN/CREAT SERPL: 9
CALCIUM SERPL-MCNC: 9.1 MG/DL (ref 8.5–10.1)
CHLORIDE SERPL-SCNC: 106 MMOL/L (ref 98–107)
CO2 SERPL-SCNC: 17 MMOL/L (ref 21–32)
CREAT SERPL-MCNC: 0.64 MG/DL (ref 0.6–1.3)
CREAT UR-MCNC: 71 MG/DL (ref 30–125)
EOSINOPHIL # BLD AUTO: 0 10^3/UL (ref 0–0.3)
EOSINOPHIL NFR BLD AUTO: 0 % (ref 0–10)
ERYTHROCYTE [DISTWIDTH] IN BLOOD BY AUTOMATED COUNT: 14.4 % (ref 10–14.5)
GFR SERPLBLD BASED ON 1.73 SQ M-ARVRAT: > 60 ML/MIN
GLUCOSE SERPL-MCNC: 86 MG/DL (ref 70–105)
HCT VFR BLD CALC: 31 % (ref 35–52)
HGB BLD-MCNC: 10.4 G/DL (ref 11.5–16)
LYMPHOCYTES # BLD AUTO: 3 X 10^3 (ref 1–4)
LYMPHOCYTES NFR BLD AUTO: 19 % (ref 12–44)
MCH RBC QN AUTO: 27 PG (ref 25–34)
MCHC RBC AUTO-ENTMCNC: 33 G/DL (ref 32–36)
MCV RBC AUTO: 82 FL (ref 80–99)
METAMYELOCYTES NFR BLD: 1 %
MONOCYTES # BLD AUTO: 0.9 X 10^3 (ref 0–1)
MONOCYTES NFR BLD AUTO: 5 % (ref 0–12)
MONOCYTES NFR BLD: 3 %
NEUTROPHILS # BLD AUTO: 12 X 10^3 (ref 1.8–7.8)
NEUTROPHILS NFR BLD AUTO: 75 % (ref 42–75)
NEUTS BAND NFR BLD MANUAL: 74 %
NEUTS BAND NFR BLD: 2 %
PLATELET # BLD: 290 10^3/UL (ref 130–400)
PMV BLD AUTO: 11.1 FL (ref 7.4–10.4)
POTASSIUM SERPL-SCNC: 3.9 MMOL/L (ref 3.6–5)
PROT SERPL-MCNC: 6.8 GM/DL (ref 6.4–8.2)
PROT UR-MCNC: 9 MG/DL (ref 6–12)
RBC MORPH BLD: NORMAL
SODIUM SERPL-SCNC: 135 MMOL/L (ref 135–145)
URATE SERPL-MCNC: 5 MG/DL (ref 2.6–7.2)
VARIANT LYMPHS NFR BLD MANUAL: 20 %
WBC # BLD AUTO: 15.9 10^3/UL (ref 4.3–11)

## 2020-04-22 PROCEDURE — 83615 LACTATE (LD) (LDH) ENZYME: CPT

## 2020-04-22 PROCEDURE — 99213 OFFICE O/P EST LOW 20 MIN: CPT

## 2020-04-22 PROCEDURE — 84156 ASSAY OF PROTEIN URINE: CPT

## 2020-04-22 PROCEDURE — 96372 THER/PROPH/DIAG INJ SC/IM: CPT

## 2020-04-22 PROCEDURE — 80053 COMPREHEN METABOLIC PANEL: CPT

## 2020-04-22 PROCEDURE — 84550 ASSAY OF BLOOD/URIC ACID: CPT

## 2020-04-22 PROCEDURE — 85007 BL SMEAR W/DIFF WBC COUNT: CPT

## 2020-04-22 PROCEDURE — 82570 ASSAY OF URINE CREATININE: CPT

## 2020-04-22 PROCEDURE — 36415 COLL VENOUS BLD VENIPUNCTURE: CPT

## 2020-04-22 PROCEDURE — 85027 COMPLETE CBC AUTOMATED: CPT

## 2020-04-22 NOTE — NUR
This RN calls Dr Rivera with pt report, current BP, pt CO headache not relieved by Tylenol, 
CO no other PreE SS. Orders received for labs and give second dose of betamethasone. RN will 
call Dr Rivera with updated pt report once labs are back.

## 2020-04-22 NOTE — PHYSICIAN QUERY-FINAL DX
DESHAWN MAGANA 4/22/20 0929:


Clinic Account Progress/Dx


Physician Query:


Please give diagnosis





Please include # weeks gestation


Date of Service





Apr 21, 2020 at 08:39





GALI PERSAUD MD 4/22/20 1549:


Clinic Account Progress/Dx


DIAGNOSIS:


Diagnosis


Chronic hypertension complicating pregnancy


Headache


34 weeks gestation











DESHAWN MAGANA                    Apr 22, 2020 09:29


GALI PERSAUD MD             Apr 22, 2020 15:49

## 2020-04-22 NOTE — NUR
VELIA YEPEZ presented to unit via ambulation from ED, for direct admit by Dr for 
elevated BP. Pt. weighed, gowned, voided, and to bed.  EFHM and TOCO applied, VS taken.  Pt. 
oriented to bed controls, call light, TV, heat, and A/C controls.

## 2020-04-22 NOTE — NUR
Dr celestin called with pt report and lab results. current BP's. Dr Celestin okay with pt DC to 
home. Dr Celestin states she will consult EFM to see if there is anything more she needs to do 
with pt care sine she has persistent headache and will let pt know.

## 2020-04-23 NOTE — PHYSICIAN QUERY-FINAL DX
Clinic Account Progress/Dx


Physician Query:


Please give diagnosis





Please include # weeks gestation


Date of Service





Apr 22, 2020 at 10:55











DESHAWN MAGANA                    Apr 23, 2020 08:22

## 2020-05-12 ENCOUNTER — HOSPITAL ENCOUNTER (INPATIENT)
Dept: HOSPITAL 75 - LDRP | Age: 25
LOS: 3 days | Discharge: HOME | End: 2020-05-15
Attending: FAMILY MEDICINE | Admitting: FAMILY MEDICINE
Payer: COMMERCIAL

## 2020-05-12 VITALS — DIASTOLIC BLOOD PRESSURE: 75 MMHG | SYSTOLIC BLOOD PRESSURE: 142 MMHG

## 2020-05-12 VITALS — DIASTOLIC BLOOD PRESSURE: 69 MMHG | SYSTOLIC BLOOD PRESSURE: 149 MMHG

## 2020-05-12 VITALS — WEIGHT: 293 LBS | BODY MASS INDEX: 43.4 KG/M2 | HEIGHT: 69.02 IN

## 2020-05-12 VITALS — SYSTOLIC BLOOD PRESSURE: 141 MMHG | DIASTOLIC BLOOD PRESSURE: 68 MMHG

## 2020-05-12 VITALS — DIASTOLIC BLOOD PRESSURE: 63 MMHG | SYSTOLIC BLOOD PRESSURE: 133 MMHG

## 2020-05-12 VITALS — DIASTOLIC BLOOD PRESSURE: 71 MMHG | SYSTOLIC BLOOD PRESSURE: 140 MMHG

## 2020-05-12 VITALS — SYSTOLIC BLOOD PRESSURE: 144 MMHG | DIASTOLIC BLOOD PRESSURE: 67 MMHG

## 2020-05-12 VITALS — DIASTOLIC BLOOD PRESSURE: 97 MMHG | SYSTOLIC BLOOD PRESSURE: 181 MMHG

## 2020-05-12 DIAGNOSIS — Z3A.37: ICD-10-CM

## 2020-05-12 LAB
ALBUMIN SERPL-MCNC: 3.2 GM/DL (ref 3.2–4.5)
ALP SERPL-CCNC: 105 U/L (ref 40–136)
ALT SERPL-CCNC: 7 U/L (ref 0–55)
BASOPHILS # BLD AUTO: 0 10^3/UL (ref 0–0.1)
BASOPHILS NFR BLD AUTO: 0 % (ref 0–10)
BILIRUB SERPL-MCNC: 0.1 MG/DL (ref 0.1–1)
BUN/CREAT SERPL: 11
CALCIUM SERPL-MCNC: 9.7 MG/DL (ref 8.5–10.1)
CHLORIDE SERPL-SCNC: 106 MMOL/L (ref 98–107)
CO2 SERPL-SCNC: 16 MMOL/L (ref 21–32)
CREAT SERPL-MCNC: 0.73 MG/DL (ref 0.6–1.3)
CREAT UR-MCNC: 149 MG/DL (ref 30–125)
EOSINOPHIL # BLD AUTO: 0 10^3/UL (ref 0–0.3)
EOSINOPHIL NFR BLD AUTO: 0 % (ref 0–10)
ERYTHROCYTE [DISTWIDTH] IN BLOOD BY AUTOMATED COUNT: 14.6 % (ref 10–14.5)
GFR SERPLBLD BASED ON 1.73 SQ M-ARVRAT: > 60 ML/MIN
GLUCOSE SERPL-MCNC: 116 MG/DL (ref 70–105)
HCT VFR BLD CALC: 34 % (ref 35–52)
HGB BLD-MCNC: 11.3 G/DL (ref 11.5–16)
LYMPHOCYTES # BLD AUTO: 2.2 X 10^3 (ref 1–4)
LYMPHOCYTES NFR BLD AUTO: 18 % (ref 12–44)
MANUAL DIFFERENTIAL PERFORMED BLD QL: NO
MCH RBC QN AUTO: 27 PG (ref 25–34)
MCHC RBC AUTO-ENTMCNC: 33 G/DL (ref 32–36)
MCV RBC AUTO: 81 FL (ref 80–99)
MONOCYTES # BLD AUTO: 0.7 X 10^3 (ref 0–1)
MONOCYTES NFR BLD AUTO: 6 % (ref 0–12)
NEUTROPHILS # BLD AUTO: 9.2 X 10^3 (ref 1.8–7.8)
NEUTROPHILS NFR BLD AUTO: 76 % (ref 42–75)
PLATELET # BLD: 295 10^3/UL (ref 130–400)
PMV BLD AUTO: 11.8 FL (ref 7.4–10.4)
POTASSIUM SERPL-SCNC: 4.7 MMOL/L (ref 3.6–5)
PROT SERPL-MCNC: 7 GM/DL (ref 6.4–8.2)
PROT UR-MCNC: 24 MG/DL (ref 6–12)
SODIUM SERPL-SCNC: 137 MMOL/L (ref 135–145)
URATE SERPL-MCNC: 6 MG/DL (ref 2.6–7.2)
WBC # BLD AUTO: 12.1 10^3/UL (ref 4.3–11)

## 2020-05-12 PROCEDURE — 85025 COMPLETE CBC W/AUTO DIFF WBC: CPT

## 2020-05-12 PROCEDURE — 84156 ASSAY OF PROTEIN URINE: CPT

## 2020-05-12 PROCEDURE — 36415 COLL VENOUS BLD VENIPUNCTURE: CPT

## 2020-05-12 PROCEDURE — 86850 RBC ANTIBODY SCREEN: CPT

## 2020-05-12 PROCEDURE — 86901 BLOOD TYPING SEROLOGIC RH(D): CPT

## 2020-05-12 PROCEDURE — 94664 DEMO&/EVAL PT USE INHALER: CPT

## 2020-05-12 PROCEDURE — 86900 BLOOD TYPING SEROLOGIC ABO: CPT

## 2020-05-12 PROCEDURE — 84550 ASSAY OF BLOOD/URIC ACID: CPT

## 2020-05-12 PROCEDURE — 82570 ASSAY OF URINE CREATININE: CPT

## 2020-05-12 PROCEDURE — 83615 LACTATE (LD) (LDH) ENZYME: CPT

## 2020-05-12 PROCEDURE — 86780 TREPONEMA PALLIDUM: CPT

## 2020-05-12 PROCEDURE — 88307 TISSUE EXAM BY PATHOLOGIST: CPT

## 2020-05-12 PROCEDURE — 80053 COMPREHEN METABOLIC PANEL: CPT

## 2020-05-12 RX ADMIN — MISOPROSTOL PRN MCG: 100 TABLET ORAL at 20:48

## 2020-05-12 RX ADMIN — SODIUM CHLORIDE, SODIUM LACTATE, POTASSIUM CHLORIDE, CALCIUM CHLORIDE, AND DEXTROSE MONOHYDRATE SCH MLS/HR: 600; 310; 30; 20; 5 INJECTION, SOLUTION INTRAVENOUS at 20:16

## 2020-05-12 RX ADMIN — Medication SCH ML: at 22:00

## 2020-05-12 NOTE — XMS REPORT
Continuity of Care Document

                             Created on: 2020



Zhane Hernandez

External Reference #: [id number]

: 1995

Sex: Female



Demographics





                          Address                   [address]  [address]

 

                          Home Phone                [phone] x

 

                          Preferred Language        Unknown

 

                          Marital Status            Unknown

 

                          Oriental orthodox Affiliation     Unknown

 

                          Race                      Unknown

 

                          Ethnic Group              Unknown





Author





                          Organization              Unknown

 

                          Address                   Unknown

 

                          Phone                     Unavailable



              



Allergies

      



             Active           Description           Code           Type         

  Severity   

                Reaction           Onset           Reported/Identified          

 

Relationship to Patient                 Clinical Status        

 

                Yes             No Known Drug Allergies           [id number]    

       Drug 

Allergy           Unknown           N/A                             2019  

      

                                                             



                  



Medications

      



There is no data.                  



Problems

      



             Date Dx Coded           Attending           Type           Code    

       

Diagnosis                               Diagnosed By        

 

             2019           SUNG CHANDLER MD           Ot           K52.

9           

NONINFECTIVE GASTROENTERITIS AND COLITIS                    

 

             2019           SUNG CHANDLER MD           Ot           K59.

00           

CONSTIPATION, UNSPECIFIED                        

 

             2019           SUNG CHANDLER MD           Ot           O16.

1           

UNSPECIFIED MATERNAL HYPERTENSION, FIRST                    

 

                2019           SUNG CHANDLER MD           Ot            

  O99.612          

                          DISEASES OF THE DGSTV SYS COMP PREGNANCY              

      

 

             2019           SUNG CHANDLER MD           Ot           R50.

9           

FEVER, UNSPECIFIED                               

 

             2019           SUNG CHANDLER MD           Ot           Z3A.

14           

14 WEEKS GESTATION OF PREGNANCY                    

 

             2019           SUNG CHANDLER MD           Ot           Z77.

22           

CNTCT W AND EXPSR TO ENVIRON TOBACCO SMO                    

 

                2019           SUNG CHANDLER MD           Ot            

  Z87.891          

                          PERSONAL HISTORY OF NICOTINE DEPENDENCE               

     

 

             2019           SUNG CHANDLER MD           Ot           K52.

9           

NONINFECTIVE GASTROENTERITIS AND COLITIS                    

 

             2019           SUNG CHANDLER MD           Ot           K59.

00           

CONSTIPATION, UNSPECIFIED                        

 

             2019           SUNG CHANDLER MD           Ot           O16.

1           

UNSPECIFIED MATERNAL HYPERTENSION, FIRST                    

 

                2019           SUNG CHANDLER MD           Ot            

  O99.612          

                          DISEASES OF THE DGSTV SYS COMP PREGNANCY              

      

 

             2019           SUNG CHANDLER MD           Ot           R50.

9           

FEVER, UNSPECIFIED                               

 

             2019           SUNG CHANDLER MD           Ot           Z3A.

14           

14 WEEKS GESTATION OF PREGNANCY                    

 

             2019           SUNG CHANDLER MD           Ot           Z77.

22           

CNTCT W AND EXPSR TO ENVIRON TOBACCO SMO                    

 

                2019           SUNG CHANDLER MD           Ot            

  Z87.891          

                          PERSONAL HISTORY OF NICOTINE DEPENDENCE               

     

 

                04/10/2020           GALI PERSAUD MD           Ot           

   O12.03          

                          GESTATIONAL EDEMA, THIRD TRIMESTER                    

 

             04/10/2020           GALI PERSAUD MD           Ot           O16

.3           

UNSPECIFIED MATERNAL HYPERTENSION, THIRD                    

 

                04/10/2020           GALI PERSAUD MD           Ot           

   Z3A.32          

                          32 WEEKS GESTATION OF PREGNANCY                    

 

                2020           GALI PERSAUD MD           Ot           

   O12.03          

                          GESTATIONAL EDEMA, THIRD TRIMESTER                    

 

             2020           GALI PERASUD MD           Ot           O16

.3           

UNSPECIFIED MATERNAL HYPERTENSION, THIRD                    

 

                2020           GALI PERSAUD MD           Ot           

   Z3A.32          

                          32 WEEKS GESTATION OF PREGNANCY                    

 

             2020           GALI PERSAUD MD           Ot           O16

.9           

UNSPECIFIED MATERNAL HYPERTENSION, UNSPE                    

 

                2020           GALI PERSAUD MD           Ot           

   Z3A.34          

                          34 WEEKS GESTATION OF PREGNANCY                    

 

                2020           GALI PERSAUD MD           Ot           

   O26.899         

                          OT PREGNANCY RELATED CONDITIONS, UNSPEC              

      

 

                2020           GALI PERSAUD MD           Ot           

   Z3A.00          

                          WEEKS OF GESTATION OF PREGNANCY NOT SPEC              

      

 

                2020           GALI PERSAUD MD           Ot           

   O26.899         

                          OTSUNNY PREGNANCY RELATED CONDITIONS, UNSPEC              

      

 

                2020           GALI PERSAUD MD           Ot           

   Z3A.00          

                          WEEKS OF GESTATION OF PREGNANCY NOT SPEC              

      



                                                                        



Procedures

      



There is no data.                  



Results

      



                    Test                Result              Range        

 

                                        ESTRADIOL - 19 09:24         

 

                    ESTRADIOL           81 pg/mL            NRG        

 

                                        FSH, SERUM - 19 09:24         

 

                    FSH                 4.4 mIU/mL           NRG        

 

                                        LH - 19 09:24         

 

                    LH                  5.1 mIU/mL           NRG        

 

                                        INSULIN LEVEL - 19 09:24         

 

                    INSULIN             24.0 uIU/mL            2.0-19.6        

 

                                        TESTOSTERONE, TOTAL (WOMEN, CHILDREN, HY

POGONADAL MALES) - 19 10:45       

 

 

                    TESTOSTERONE, TOTAL, LC/MS/MS           15 ng/dL            

2-45        

 

                    FREE TESTOSTERONE           2.6 pg/mL           0.1-6.4     

   

 

                                        LIPID PANEL - 19 09:05         

 

                    CHOLESTEROL, TOTAL           200 mg/dL           <200       

 

 

                    HDL CHOLESTEROL           38 mg/dL            >50        

 

                    TRIGLYCERIDES           124 mg/dL           <150        

 

                    LDL-CHOLESTEROL           137 mg/dL (calc)           NRG    

    

 

                    CHOL/HDLC RATIO           5.3 (calc)           <5.0        

 

                    NON HDL CHOLESTEROL           162 mg/dL (calc)           <13

0        

 

                                        INSULIN LEVEL - 19 09:07         

 

                    INSULIN             15.3 uIU/mL            2.0-19.6        

 

                                        TSH w/ FREE T4 - 19 08:24         

 

                    TSH                 2.06 mIU/L           NRG        

 

                    T4, FREE            1.3 ng/dL           0.8-1.8        

 

                                        CMP - 19 08:24         

 

                    GLUCOSE             99 mg/dL            65-99        

 

                    UREA NITROGEN (BUN)           9 mg/dL             7-25      

  

 

                    CREATININE           0.72 mg/dL           0.50-1.10        

 

                    eGFR NON-AFR. AMERICAN           119 mL/min/1.73m2          

 > OR = 60        

 

                    eGFR            138 mL/min/1.73m2           

> OR = 60        

 

                    BUN/CREATININE RATIO           NOT APPLICABLE (calc)        

   6-22        

 

                    SODIUM              136 mmol/L           135-146        

 

                    POTASSIUM           4.5 mmol/L           3.5-5.3        

 

                    CHLORIDE            104 mmol/L                   

 

                    CARBON DIOXIDE           24 mmol/L           20-32        

 

                    CALCIUM             9.1 mg/dL           8.6-10.2        

 

                    PROTEIN, TOTAL           6.9 g/dL            6.1-8.1        

 

                    ALBUMIN             4.0 g/dL            3.6-5.1        

 

                    GLOBULIN            2.9 g/dL (calc)           1.9-3.7       

 

 

                    ALBUMIN/GLOBULIN RATIO           1.4 (calc)           1.0-2.

5        

 

                    BILIRUBIN, TOTAL           0.3 mg/dL           0.2-1.2      

  

 

                    ALKALINE PHOSPHATASE           62 U/L                 

     

 

                    AST                 14 U/L              10-30        

 

                    ALT                 13 U/L              6-29        

 

                                        INSULIN LEVEL - 19 08:24         

 

                    INSULIN             20.8 uIU/mL            2.0-19.6        

 

                                        ANTIBODY SCREEN - 10/09/19 14:40        

 

 

                          ANTIBODY SCREEN, RBC W/REFL ID, TITER AND AG          

 NO ANTIBODIES DETECTED   

                                        NRG        

 

                                        TSH - 10/09/19 14:40         

 

                    TSH                 1.81 mIU/L           NRG        

 

                                        RUBELLA IMMUNE STATUS - 10/09/19 14:40  

       

 

                    RUBELLA ANTIBODY (IGG)           1.41 index           NRG   

     

 

                                        CULTURE, URINE - 10/09/19 14:40         

 

                    CULTURE, URINE, ROUTINE           SEE NOTE            NRG   

     

 

                                        SUREPATH PAP AND HPV mRNA E6/E7 - 10/09/

19 14:40         

 

                    CLINICAL INFORMATION:                               NRG     

   

 

                    LMP:                                    NRG        

 

                    PREV. PAP:                               NRG        

 

                    PREV. BX:           CX                  NRG        

 

                    SOURCE:             [employer]              NRG        

 

                    STATEMENT OF ADEQUACY:                               NRG    

    

 

                    INTERPRETATION/RESULT:                               NRG    

    

 

                    CYTOTECHNOLOGIST:                               NRG        

 

                    HPV mRNA E6/E7, SUREPATH VIAL           Not Detected        

    NOT DETECTED    

   

 

                    REVIEW CYTOTECHNOLOGIST:                               NRG  

      

 

                    COMMENT                                 NRG        

 

                                        URINE PROTEIN 24 HOUR - 10/15/19 08:20  

       

 

                    PROTEIN, TOTAL, 24 HR UR           156 mg/24 h           <15

0        

 

                                        QNATAL - 19 10:27         

 

                    NUMBER OF FETUSES?           1                   NRG        

 

                    ADVANCED MATERNAL AGE?           NO                  NRG    

    

 

                    ABNORMAL CASSIA?           NO                  NRG        

 

                    ABNORMAL US?           NO                  NRG        

 

                    PERSONAL/FAM HISTORY?           NO                  NRG     

   

 

                    INTERPRETATION           TNP                 NRG        

 

                                        Complete blood count (CBC) with automate

d white blood cell (WBC) differential - 

19 18:40         

 

                          Blood leukocytes automated count (number/volume)      

     8.9 10*3/uL          

                                        4.3-11.0        

 

                          Blood erythrocytes automated count (number/volume)    

       4.14 10*6/uL       

                                        4.35-5.85        

 

                    Venous blood hemoglobin measurement (mass/volume)           

11.4 g/dL           

11.5-16.0        

 

                    Blood hematocrit (volume fraction)           34 %           

     35-52        

 

                    Automated erythrocyte mean corpuscular volume           81 [

foz_us]           

80-99        

 

                                        Automated erythrocyte mean corpuscular h

emoglobin (mass per erythrocyte)        

                          28 pg                     25-34        

 

                                        Automated erythrocyte mean corpuscular h

emoglobin concentration measurement 

(mass/volume)             34 g/dL                   32-36        

 

                    Automated erythrocyte distribution width ratio           13.

9 %              10.0-

14.5        

 

                    Automated blood platelet count (count/volume)           275 

10*3/uL           

130-400        

 

                          Automated blood platelet mean volume measurement      

     10.9 [foz_us]        

                                        7.4-10.4        

 

                    Automated blood neutrophils/100 leukocytes           70 %   

             42-75       

 

 

                    Automated blood lymphocytes/100 leukocytes           23 %   

             12-44       

 

 

                    Blood monocytes/100 leukocytes           7 %                

 0-12        

 

                    Automated blood eosinophils/100 leukocytes           0 %    

             0-10        

 

                    Automated blood basophils/100 leukocytes           0 %      

           0-10        

 

                    Blood neutrophils automated count (number/volume)           

6.2 10*3            

1.8-7.8        

 

                    Blood lymphocytes automated count (number/volume)           

2.0 10*3            

1.0-4.0        

 

                    Blood monocytes automated count (number/volume)           0.

7 10*3            

0.0-1.0        

 

                    Automated eosinophil count           0.0 10*3/uL           0

.0-0.3        

 

                    Automated blood basophil count (count/volume)           0.0 

10*3/uL           

0.0-0.1        

 

                                        Comprehensive metabolic panel - 19

 18:40         

 

                          Serum or plasma sodium measurement (moles/volume)     

      136 mmol/L          

                                        135-145        

 

                          Serum or plasma potassium measurement (moles/volume)  

         3.8 mmol/L       

                                        3.6-5.0        

 

                          Serum or plasma chloride measurement (moles/volume)   

        106 mmol/L        

                                                

 

                    Carbon dioxide           18 mmol/L           21-32        

 

                          Serum or plasma anion gap determination (moles/volume)

           12 mmol/L      

                                        5-14        

 

                          Serum or plasma urea nitrogen measurement (mass/volume

)           4 mg/dL       

                                        7-18        

 

                          Serum or plasma creatinine measurement (mass/volume)  

         0.64 mg/dL       

                                        0.60-1.30        

 

                    Serum or plasma urea nitrogen/creatinine mass ratio         

  6                   NRG  

      

 

                                        Serum or plasma creatinine measurement w

ith calculation of estimated glomerular 

filtration rate           >                         NRG        

 

                    Serum or plasma glucose measurement (mass/volume)           

86 mg/dL            

        

 

                    Serum or plasma calcium measurement (mass/volume)           

9.2 mg/dL           

8.5-10.1        

 

                          Serum or plasma total bilirubin measurement (mass/volu

me)           0.3 mg/dL   

                                        0.1-1.0        

 

                                        Serum or plasma alkaline phosphatase mikael

surement (enzymatic activity/volume)    

                          56 U/L                            

 

                                        Serum or plasma aspartate aminotransfera

se measurement (enzymatic 

activity/volume)           19 U/L                    5-34        

 

                                        Serum or plasma alanine aminotransferase

 measurement (enzymatic activity/volume)

                          18 U/L                    0-55        

 

                    Serum or plasma protein measurement (mass/volume)           

6.9 g/dL            

6.4-8.2        

 

                    Serum or plasma albumin measurement (mass/volume)           

3.7 g/dL            

3.2-4.5        

 

                    CALCIUM CORRECTED           9.4 mg/dL           8.5-10.1    

    

 

                                        Magnesium - 19 18:40         

 

                    Magnesium           1.9 mg/dL           1.6-2.4        

 

                                        Lipase - 19 18:40         

 

                    Lipase              10 U/L              8-78        

 

                                        PENTA SCREEN - 19 15:12         

 

                    Maternal Weight           319 lbs             NRG        

 

                    Est'd Date of Delivery           2020            NRG  

      

 

                    THONG Determined by           ULTRASOUND            NRG       

 

 

                    Mother's Ethnic Origin                       NRG   

     

 

                    Number of Fetuses           1                   NRG        

 

                    Insulin Depend Diabetic           NO                  NRG   

     

 

                    Repeat Specimen           NO                  NRG        

 

                    Hx Of Neural Tube Defects           NO                  NRG 

       

 

                    Prev Pregnancy Down Synd           NO                  NRG  

      

 

                    Donor Egg           NO                  NRG        

 

                    Donor Age: Egg Retrieval           NOT GIVEN            NRG 

       

 

                    Cigarette smoker           NOT GIVEN            NRG        

 

                    INTERPRETATION:           SEE NOTE            NRG        

 

                    Risk for ONTD           <1:5000             NRG        

 

                    Age Risk Down Syndrome           1:1133              NRG    

    

 

                    CASSIA Down Syndrome Risk           <1:5000             <1:270 

       

 

                    CASSIA Trisomy 18 Risk           <1:5000             <1:100    

    

 

                    Calc'd Gestational Age           16.1                NRG    

    

 

                    AFP, Serum           25.8 ng/mL           NRG        

 

                    AFP MoM             1.14                NRG        

 

                    hCG, Serum           11.4 IU/mL           NRG        

 

                    hCG MoM             0.47                NRG        

 

                    Estriol, Free           0.67 ng/mL           NRG        

 

                    Estriol MoM           1.04                NRG        

 

                    Inhibin A, Dimeric           87 pg/mL            NRG        

 

                    Inhibin A MoM           0.67                NRG        

 

                    h-hCG, Serum           7.5 mcg/L           NRG        

 

                    h-hCG MoM           0.37                NRG        

 

                    Date of Birth           1995            NRG        

 

                    Collection Date           2019            NRG        

 

                                        CULTURE, GENITAL - 20 19:00       

  

 

                    CULTURE, GENITAL           SEE NOTE            NRG        

 

                                        LDH - 20 16:04         

 

                    LD                  116 U/L             100-200        

 

                                        URIC ACID, SERUM - 20 16:04       

  

 

                    URIC ACID           5.0 mg/dL           2.5-7.0        

 

                                        GLUCOSE MANUELA 1 HOUR - 20 14:58     

    

 

                    GLUCOSE, POSTPRANDIAL/ 1 HOUR           141 mg/dL           

See Note:        

 

                                        SYPHILIS (RPR W/ REFLEX CONFIRMATION) - 

20 14:58         

 

                          RPR (DX) W/REFL TITER AND CONFIRMATORY TESTING        

   NON-REACTIVE           

                                        NON-REACTIVE        

 

                                        GLUCOSE MANUELA 3 HOUR - 20 14:04     

    

 

                    TIME 1              0830                NRG        

 

                    SPECIMEN 1           86 mg/dL            65-99        

 

                    TIME 2              0930                NRG        

 

                    SPECIMEN 2           165 mg/dL           NRG        

 

                    TIME 3              1030                NRG        

 

                    SPECIMEN 3           109 mg/dL           NRG        

 

                    TIME 4              1130                NRG        

 

                    SPECIMEN 4           56 mg/dL            NRG        

 

                    COMMENT                                 NRG        

 

                                        PROTEIN, TOTAL W/CREAT, RANDOM URINE - 0

3/25/20 14:34         

 

                    CREATININE, RANDOM URINE           110 mg/dL           20-27

5        

 

                    PROTEIN/CREATININE RATIO           127 mg/g creat           

        

 

                    PROTEIN, TOTAL, RANDOM UR           14 mg/dL            5-24

        

 

                                        Urine protein/creatinine mass ratio - 04

/10/20 10:50         

 

                    Urine protein measurement (mass/volume)           11 mg/dL  

          6-12       

 

 

                    Urine creatinine measurement (mass/volume)           64 mg/d

L              

      

 

                    Urine protein/creatinine mass ratio           0.17          

      NRG        

 

                                        Automated blood complete blood count (he

mogram) panel - 04/10/20 10:55         

 

                          Blood leukocytes automated count (number/volume)      

     13.9 10*3/uL         

                                        4.3-11.0        

 

                          Blood erythrocytes automated count (number/volume)    

       3.86 10*6/uL       

                                        4.35-5.85        

 

                    Venous blood hemoglobin measurement (mass/volume)           

10.5 g/dL           

11.5-16.0        

 

                    Blood hematocrit (volume fraction)           32 %           

     35-52        

 

                    Automated erythrocyte mean corpuscular volume           82 [

foz_us]           

80-99        

 

                                        Automated erythrocyte mean corpuscular h

emoglobin (mass per erythrocyte)        

                          27 pg                     25-34        

 

                                        Automated erythrocyte mean corpuscular h

emoglobin concentration measurement 

(mass/volume)             33 g/dL                   32-36        

 

                    Automated erythrocyte distribution width ratio           14.

1 %              10.0-

14.5        

 

                    Automated blood platelet count (count/volume)           281 

10*3/uL           

130-400        

 

                          Automated blood platelet mean volume measurement      

     11.1 [foz_us]        

                                        7.4-10.4        

 

                                        Comprehensive metabolic panel - 04/10/20

 10:55         

 

                          Serum or plasma sodium measurement (moles/volume)     

      135 mmol/L          

                                        135-145        

 

                          Serum or plasma potassium measurement (moles/volume)  

         4.2 mmol/L       

                                        3.6-5.0        

 

                          Serum or plasma chloride measurement (moles/volume)   

        106 mmol/L        

                                                

 

                    Carbon dioxide           18 mmol/L           21-32        

 

                          Serum or plasma anion gap determination (moles/volume)

           11 mmol/L      

                                        5-14        

 

                          Serum or plasma urea nitrogen measurement (mass/volume

)           7 mg/dL       

                                        7-18        

 

                          Serum or plasma creatinine measurement (mass/volume)  

         0.66 mg/dL       

                                        0.60-1.30        

 

                    Serum or plasma urea nitrogen/creatinine mass ratio         

  11                  NRG 

       

 

                                        Serum or plasma creatinine measurement w

ith calculation of estimated glomerular 

filtration rate           >                         NRG        

 

                    Serum or plasma glucose measurement (mass/volume)           

88 mg/dL            

        

 

                    Serum or plasma calcium measurement (mass/volume)           

9.1 mg/dL           

8.5-10.1        

 

                          Serum or plasma total bilirubin measurement (mass/volu

me)           0.1 mg/dL   

                                        0.1-1.0        

 

                                        Serum or plasma alkaline phosphatase mikael

surement (enzymatic activity/volume)    

                          79 U/L                            

 

                                        Serum or plasma aspartate aminotransfera

se measurement (enzymatic 

activity/volume)           8 U/L                     5-34        

 

                                        Serum or plasma alanine aminotransferase

 measurement (enzymatic activity/volume)

                          7 U/L                     0-55        

 

                    Serum or plasma protein measurement (mass/volume)           

6.7 g/dL            

6.4-8.2        

 

                    Serum or plasma albumin measurement (mass/volume)           

3.4 g/dL            

3.2-4.5        

 

                    CALCIUM CORRECTED           9.6 mg/dL           8.5-10.1    

    

 

                                        Serum or plasma uric acid measurement (m

ass/volume) - 04/10/20 10:55         

 

                          Serum or plasma uric acid measurement (mass/volume)   

        4.5 mg/dL         

                                        2.6-7.2        

 

                                        Serum ragweed IgE antibody assay - 04/10

/20 10:55         

 

                    Serum ragweed IgE antibody assay           140 U/L          

   125-220        

 

                                        Urine protein/creatinine mass ratio -  08:50         

 

                    Urine protein measurement (mass/volume)           10 mg/dL  

          6-12       

 

 

                    Urine creatinine measurement (mass/volume)           68 mg/d

L              

      

 

                    Urine protein/creatinine mass ratio           0.15          

      NRG        

 

                                        Complete blood count (CBC) with automate

d white blood cell (WBC) differential - 

20 09:10         

 

                          Blood leukocytes automated count (number/volume)      

     12.0 10*3/uL         

                                        4.3-11.0        

 

                          Blood erythrocytes automated count (number/volume)    

       3.81 10*6/uL       

                                        4.35-5.85        

 

                    Venous blood hemoglobin measurement (mass/volume)           

10.5 g/dL           

11.5-16.0        

 

                    Blood hematocrit (volume fraction)           31 %           

     35-52        

 

                    Automated erythrocyte mean corpuscular volume           82 [

foz_us]           

80-99        

 

                                        Automated erythrocyte mean corpuscular h

emoglobin (mass per erythrocyte)        

                          28 pg                     25-34        

 

                                        Automated erythrocyte mean corpuscular h

emoglobin concentration measurement 

(mass/volume)             33 g/dL                   32-36        

 

                    Automated erythrocyte distribution width ratio           14.

6 %              10.0-

14.5        

 

                    Automated blood platelet count (count/volume)           284 

10*3/uL           

130-400        

 

                          Automated blood platelet mean volume measurement      

     11.1 [foz_us]        

                                        7.4-10.4        

 

                    Automated blood neutrophils/100 leukocytes           79 %   

             42-75       

 

 

                    Automated blood lymphocytes/100 leukocytes           16 %   

             12-44       

 

 

                    Blood monocytes/100 leukocytes           5 %                

 0-12        

 

                    Automated blood eosinophils/100 leukocytes           1 %    

             0-10        

 

                    Automated blood basophils/100 leukocytes           0 %      

           0-10        

 

                    Blood neutrophils automated count (number/volume)           

9.5 10*3            

1.8-7.8        

 

                    Blood lymphocytes automated count (number/volume)           

1.9 10*3            

1.0-4.0        

 

                    Blood monocytes automated count (number/volume)           0.

6 10*3            

0.0-1.0        

 

                    Automated eosinophil count           0.1 10*3/uL           0

.0-0.3        

 

                    Automated blood basophil count (count/volume)           0.0 

10*3/uL           

0.0-0.1        

 

                                        Comprehensive metabolic panel - 20

 09:10         

 

                          Serum or plasma sodium measurement (moles/volume)     

      136 mmol/L          

                                        135-145        

 

                          Serum or plasma potassium measurement (moles/volume)  

         4.1 mmol/L       

                                        3.6-5.0        

 

                          Serum or plasma chloride measurement (moles/volume)   

        106 mmol/L        

                                                

 

                    Carbon dioxide           17 mmol/L           21-32        

 

                          Serum or plasma anion gap determination (moles/volume)

           13 mmol/L      

                                        5-14        

 

                          Serum or plasma urea nitrogen measurement (mass/volume

)           6 mg/dL       

                                        7-18        

 

                          Serum or plasma creatinine measurement (mass/volume)  

         0.67 mg/dL       

                                        0.60-1.30        

 

                    Serum or plasma urea nitrogen/creatinine mass ratio         

  9                   NRG  

      

 

                                        Serum or plasma creatinine measurement w

ith calculation of estimated glomerular 

filtration rate           >                         NRG        

 

                    Serum or plasma glucose measurement (mass/volume)           

103 mg/dL           

        

 

                    Serum or plasma calcium measurement (mass/volume)           

9.0 mg/dL           

8.5-10.1        

 

                          Serum or plasma total bilirubin measurement (mass/volu

me)           0.2 mg/dL   

                                        0.1-1.0        

 

                                        Serum or plasma alkaline phosphatase mikael

surement (enzymatic activity/volume)    

                          78 U/L                            

 

                                        Serum or plasma aspartate aminotransfera

se measurement (enzymatic 

activity/volume)           11 U/L                    5-34        

 

                                        Serum or plasma alanine aminotransferase

 measurement (enzymatic activity/volume)

                          9 U/L                     0-55        

 

                    Serum or plasma protein measurement (mass/volume)           

6.7 g/dL            

6.4-8.2        

 

                    Serum or plasma albumin measurement (mass/volume)           

3.2 g/dL            

3.2-4.5        

 

                    CALCIUM CORRECTED           9.6 mg/dL           8.5-10.1    

    

 

                                        Serum or plasma uric acid measurement (m

ass/volume) - 20 09:10         

 

                          Serum or plasma uric acid measurement (mass/volume)   

        5.1 mg/dL         

                                        2.6-7.2        

 

                                        Serum ragweed IgE antibody assay -  09:10         

 

                    Serum ragweed IgE antibody assay           156 U/L          

   125-220        

 

                                        [id number] - 20 09:10         

 

                    [id number]             SPECIMEN AVAILABLE            NRG       

 

 

                                        Urine protein/creatinine mass ratio -  11:40         

 

                    Urine protein measurement (mass/volume)           9 mg/dL   

          6-12        

 

                    Urine creatinine measurement (mass/volume)           71 mg/d

L              

      

 

                    Urine protein/creatinine mass ratio           0.13          

      NRG        

 

                                        Blood CBC with ordered manual differenti

al panel - 20 12:05         

 

                          Blood leukocytes automated count (number/volume)      

     15.9 10*3/uL         

                                        4.3-11.0        

 

                          Blood erythrocytes automated count (number/volume)    

       3.79 10*6/uL       

                                        4.35-5.85        

 

                    Venous blood hemoglobin measurement (mass/volume)           

10.4 g/dL           

11.5-16.0        

 

                    Blood hematocrit (volume fraction)           31 %           

     35-52        

 

                    Automated erythrocyte mean corpuscular volume           82 [

foz_us]           

80-99        

 

                                        Automated erythrocyte mean corpuscular h

emoglobin (mass per erythrocyte)        

                          27 pg                     25-34        

 

                                        Automated erythrocyte mean corpuscular h

emoglobin concentration measurement 

(mass/volume)             33 g/dL                   32-36        

 

                    Automated erythrocyte distribution width ratio           14.

4 %              10.0-

14.5        

 

                    Automated blood platelet count (count/volume)           290 

10*3/uL           

130-400        

 

                          Automated blood platelet mean volume measurement      

     11.1 [foz_us]        

                                        7.4-10.4        

 

                    Automated blood neutrophils/100 leukocytes           75 %   

             42-75       

 

 

                    Automated blood lymphocytes/100 leukocytes           19 %   

             12-44       

 

 

                    Blood monocytes/100 leukocytes           3 %                

 NRG        

 

                    Automated blood eosinophils/100 leukocytes           0 %    

             0-10        

 

                    Automated blood basophils/100 leukocytes           0 %      

           0-10        

 

                    Blood neutrophils automated count (number/volume)           

12.0 10*3           

1.8-7.8        

 

                    Blood lymphocytes automated count (number/volume)           

3.0 10*3            

1.0-4.0        

 

                    Blood monocytes automated count (number/volume)           0.

9 10*3            

0.0-1.0        

 

                    Automated eosinophil count           0.0 10*3/uL           0

.0-0.3        

 

                    Automated blood basophil count (count/volume)           0.0 

10*3/uL           

0.0-0.1        

 

                    Manual blood segmented neutrophils/100 leukocytes           

74 %                NRG  

      

 

                    Blood band neutrophils/100 leukocytes           2 %         

        NRG        

 

                    Manual blood lymphocytes/100 leukocytes           20 %      

          NRG        

 

                    Blood erythrocyte morphology finding identification         

  NORMAL              

NRG        

 

                    Manual blood metamyelocytes/100 leukocytes           1 %    

             NRG        

 

                                        Comprehensive metabolic panel - 20

 12:05         

 

                          Serum or plasma sodium measurement (moles/volume)     

      135 mmol/L          

                                        135-145        

 

                          Serum or plasma potassium measurement (moles/volume)  

         3.9 mmol/L       

                                        3.6-5.0        

 

                          Serum or plasma chloride measurement (moles/volume)   

        106 mmol/L        

                                                

 

                    Carbon dioxide           17 mmol/L           21-32        

 

                          Serum or plasma anion gap determination (moles/volume)

           12 mmol/L      

                                        5-14        

 

                          Serum or plasma urea nitrogen measurement (mass/volume

)           6 mg/dL       

                                        7-18        

 

                          Serum or plasma creatinine measurement (mass/volume)  

         0.64 mg/dL       

                                        0.60-1.30        

 

                    Serum or plasma urea nitrogen/creatinine mass ratio         

  9                   NRG  

      

 

                                        Serum or plasma creatinine measurement w

ith calculation of estimated glomerular 

filtration rate           >                         NRG        

 

                    Serum or plasma glucose measurement (mass/volume)           

86 mg/dL            

        

 

                    Serum or plasma calcium measurement (mass/volume)           

9.1 mg/dL           

8.5-10.1        

 

                          Serum or plasma total bilirubin measurement (mass/volu

me)           0.2 mg/dL   

                                        0.1-1.0        

 

                                        Serum or plasma alkaline phosphatase mikael

surement (enzymatic activity/volume)    

                          85 U/L                            

 

                                        Serum or plasma aspartate aminotransfera

se measurement (enzymatic 

activity/volume)           9 U/L                     5-34        

 

                                        Serum or plasma alanine aminotransferase

 measurement (enzymatic activity/volume)

                          10 U/L                    0-55        

 

                    Serum or plasma protein measurement (mass/volume)           

6.8 g/dL            

6.4-8.2        

 

                    Serum or plasma albumin measurement (mass/volume)           

3.2 g/dL            

3.2-4.5        

 

                    CALCIUM CORRECTED           9.7 mg/dL           8.5-10.1    

    

 

                                        Serum or plasma uric acid measurement (m

ass/volume) - 20 12:05         

 

                          Serum or plasma uric acid measurement (mass/volume)   

        5.0 mg/dL         

                                        2.6-7.2        

 

                                        Serum ragweed IgE antibody assay -  12:05         

 

                    Serum ragweed IgE antibody assay           141 U/L          

   125-220        

 

                                        CMP - 20 13:42         

 

                    GLUCOSE             76 mg/dL                    

 

                    UREA NITROGEN (BUN)           7 mg/dL             7-25      

  

 

                    CREATININE           0.58 mg/dL           0.50-1.10        

 

                    eGFR NON-AFR. AMERICAN           131 mL/min/1.73m2          

 > OR = 60        

 

                    eGFR            152 mL/min/1.73m2           

> OR = 60        

 

                    BUN/CREATININE RATIO           NOT APPLICABLE (calc)        

   6-22        

 

                    SODIUM              136 mmol/L           135-146        

 

                    POTASSIUM           4.5 mmol/L           3.5-5.3        

 

                    CHLORIDE            102 mmol/L                   

 

                    CARBON DIOXIDE           19 mmol/L           20-32        

 

                    CALCIUM             9.3 mg/dL           8.6-10.2        

 

                    PROTEIN, TOTAL           6.2 g/dL            6.1-8.1        

 

                    ALBUMIN             3.2 g/dL            3.6-5.1        

 

                    GLOBULIN            3.0 g/dL (calc)           1.9-3.7       

 

 

                    ALBUMIN/GLOBULIN RATIO           1.1 (calc)           1.0-2.

5        

 

                    BILIRUBIN, TOTAL           0.2 mg/dL           0.2-1.2      

  

 

                    ALKALINE PHOSPHATASE           97 U/L                 

     

 

                    AST                 8 U/L               10-30        

 

                    ALT                 6 U/L               6-29        

 

                                        LDH - 20 13:42         

 

                    LD                  143 U/L             100-200        

 

                                        URIC ACID, SERUM - 20 13:42       

  

 

                    URIC ACID           5.3 mg/dL           2.5-7.0        

 

                                        GLUCOSE MANUELA 3 HOUR - 20 13:42     

    

 

                    TIME 1              NG                  NRG        

 

                    SPECIMEN 1           77 mg/dL            65-99        

 

                    TIME 2              NG                  NRG        

 

                    SPECIMEN 2           172 mg/dL           NRG        

 

                    TIME 3              NG                  NRG        

 

                    SPECIMEN 3           117 mg/dL           NRG        

 

                    TIME 4              NG                  NRG        

 

                    SPECIMEN 4           65 mg/dL            NRG        

 

                    COMMENT                                 NRG        

 

                                        PROTEIN, TOTAL W/CREAT, RANDOM URINE - 0

20 13:42         

 

                    CREATININE, RANDOM URINE           59 mg/dL            20-27

5        

 

                    PROTEIN/CREATININE RATIO           203 mg/g creat           

        

 

                    PROTEIN, TOTAL, RANDOM UR           12 mg/dL            5-24

        

 

                                        CBC - 20 13:42         

 

                    WHITE BLOOD CELL COUNT           13.5 Thousand/uL           

3.8-10.8        

 

                    RED BLOOD CELL COUNT           4.00 Million/uL           3.8

0-5.10        

 

                    HEMOGLOBIN           10.9 g/dL           11.7-15.5        

 

                    HEMATOCRIT           33.3 %              35.0-45.0        

 

                    MCV                 83.3 fL             80.0-100.0        

 

                    MCH                 27.3 pg             27.0-33.0        

 

                    MCHC                32.7 g/dL           32.0-36.0        

 

                    RDW                 13.8 %              11.0-15.0        

 

                    PLATELET COUNT           290 Thousand/uL           140-400  

      

 

                    MPV                 11.1 fL             7.5-12.5        

 

                    ABSOLUTE NEUTROPHILS           [id number] cells/uL           1500

-7800        

 

                    ABSOLUTE LYMPHOCYTES           2255 cells/uL           850-3

900        

 

                    ABSOLUTE MONOCYTES           527 cells/uL           200-950 

       

 

                    ABSOLUTE EOSINOPHILS           54 cells/uL            

       

 

                    ABSOLUTE BASOPHILS           14 cells/uL           0-200    

    

 

                    NEUTROPHILS           78.9 %              NRG        

 

                    LYMPHOCYTES           16.7 %              NRG        

 

                    MONOCYTES           3.9 %               NRG        

 

                    EOSINOPHILS           0.4 %               NRG        

 

                    BASOPHILS           0.1 %               NRG        

 

                                        URIC ACID, SERUM - 20 11:56       

  

 

                    URIC ACID           5.6 mg/dL           2.5-7.0        

 

                                        PROTEIN, TOTAL W/CREAT, RANDOM URINE - 0

20 11:56         

 

                    CREATININE, RANDOM URINE           116 mg/dL           20-27

5        

 

                    PROTEIN/CREATININE RATIO           190 mg/g creat           

        

 

                    PROTEIN, TOTAL, RANDOM UR           22 mg/dL            5-24

        

 

                                        CBC - 20 11:56         

 

                    WHITE BLOOD CELL COUNT           15.3 Thousand/uL           

3.8-10.8        

 

                    RED BLOOD CELL COUNT           4.21 Million/uL           3.8

0-5.10        

 

                    HEMOGLOBIN           11.4 g/dL           11.7-15.5        

 

                    HEMATOCRIT           35.2 %              35.0-45.0        

 

                    MCV                 83.6 fL             80.0-100.0        

 

                    MCH                 27.1 pg             27.0-33.0        

 

                    MCHC                32.4 g/dL           32.0-36.0        

 

                    RDW                 13.9 %              11.0-15.0        

 

                    PLATELET COUNT           316 Thousand/uL           140-400  

      

 

                    MPV                 10.8 fL             7.5-12.5        

 

                    ABSOLUTE NEUTROPHILS           35426 cells/uL           1500

-7800        

 

                    ABSOLUTE LYMPHOCYTES           2509 cells/uL           850-3

900        

 

                    ABSOLUTE MONOCYTES           673 cells/uL           200-950 

       

 

                    ABSOLUTE EOSINOPHILS           46 cells/uL            

       

 

                    ABSOLUTE BASOPHILS           31 cells/uL           0-200    

    

 

                    NEUTROPHILS           78.7 %              NRG        

 

                    LYMPHOCYTES           16.4 %              NRG        

 

                    MONOCYTES           4.4 %               NRG        

 

                    EOSINOPHILS           0.3 %               NRG        

 

                    BASOPHILS           0.2 %               NRG        

 

                                        CULTURE, GROUP B STREP (VAGINAL) -  11:56         

 

                    STREPTOCOCCUS, GROUP B CULTURE           SEE NOTE           

 NRG        

 

                                        Complete blood count (CBC) with automate

d white blood cell (WBC) differential - 

20 19:30         

 

                          Blood leukocytes automated count (number/volume)      

     12.1 10*3/uL         

                                        4.3-11.0        

 

                          Blood erythrocytes automated count (number/volume)    

       4.19 10*6/uL       

                                        4.35-5.85        

 

                    Venous blood hemoglobin measurement (mass/volume)           

11.3 g/dL           

11.5-16.0        

 

                    Blood hematocrit (volume fraction)           34 %           

     35-52        

 

                    Automated erythrocyte mean corpuscular volume           81 [

foz_us]           

80-99        

 

                                        Automated erythrocyte mean corpuscular h

emoglobin (mass per erythrocyte)        

                          27 pg                     25-34        

 

                                        Automated erythrocyte mean corpuscular h

emoglobin concentration measurement 

(mass/volume)             33 g/dL                   32-36        

 

                    Automated erythrocyte distribution width ratio           14.

6 %              10.0-

14.5        

 

                    Automated blood platelet count (count/volume)           295 

10*3/uL           

130-400        

 

                          Automated blood platelet mean volume measurement      

     11.8 [foz_us]        

                                        7.4-10.4        

 

                    Automated blood neutrophils/100 leukocytes           76 %   

             42-75       

 

 

                    Automated blood lymphocytes/100 leukocytes           18 %   

             12-44       

 

 

                    Blood monocytes/100 leukocytes           6 %                

 0-12        

 

                    Automated blood eosinophils/100 leukocytes           0 %    

             0-10        

 

                    Automated blood basophils/100 leukocytes           0 %      

           0-10        

 

                    Blood neutrophils automated count (number/volume)           

9.2 10*3            

1.8-7.8        

 

                    Blood lymphocytes automated count (number/volume)           

2.2 10*3            

1.0-4.0        

 

                    Blood monocytes automated count (number/volume)           0.

7 10*3            

0.0-1.0        

 

                    Automated eosinophil count           0.0 10*3/uL           0

.0-0.3        

 

                    Automated blood basophil count (count/volume)           0.0 

10*3/uL           

0.0-0.1        



                                                                                
                                              



Encounters

      



                ACCT No.           Visit Date/Time           Discharge          

 Status         

             Pt. Type           Provider           Facility           Loc./Unit 

          

Complaint        

 

                [id number]           2020 10:00:00           2020 23:59:

59           CLS

                Outpatient           GALI PERSAUD MD                        

   [facility]                                  

 

             [id number]           2020 11:00:00                              

       Document

 Registration                                                                   

 

 

             [id number]           2020 10:20:00                              

       Document

 Registration                                                                   

 

 

             [id number]           2020 11:20:00                              

       Document

 Registration                                                                   

 

 

             [id number]           2020 14:00:00                              

       Document

 Registration                                                                   

 

 

             [id number]           2020 15:00:00                              

       Document

 Registration                                                                   

 

 

             [id number]           2020 15:40:00                              

       Document

 Registration                                                                   

 

 

             [id number]           2020 13:20:00                              

       Document

 Registration                                                                   

 

 

             [id number]           2019 13:20:00                              

       Document

 Registration                                                                   

 

 

             [id number]           2019 09:00:00                              

       Document

 Registration                                                                   

 

 

             [id number]           10/15/2019 08:20:00                              

       Document

 Registration                                                                   

 

 

             [id number]           10/09/2019 13:20:00                              

       Document

 Registration                                                                   

 

 

             [id number]           2019 09:20:00                              

       Document

 Registration                                                                   

 

 

             [id number]           2019 09:00:00                              

       Document

 Registration                                                                   

 

 

             [id number]           2019 10:40:00                              

       Document

 Registration                                                                   

 

 

             [id number]           2019 08:20:00                              

       Document

 Registration                                                                   

 

 

                    [id number]           2020 10:55:00            13:05:00        

                DIS             Outpatient           GALI PERSAUD MD        

   [facility]                       HIGH BP        

 

                    [id number]           2020 08:39:00            13:15:00        

                DIS             Outpatient           GALI PERSAUD MD        

   [facility]                       HIGH BP        

 

                    [id number]           04/10/2020 10:30:00           04/10/2

020 13:23:00        

                DIS             Outpatient           GALI PERSAUD MD        

   [facility]                       HIGH BLOOD PRESSURE    

    

 

                    [id number]           2019 17:13:00            19:34:00        

                DIS             Emergency           SUNG CHANDLER MD          

 [facility]           ER                        CONSTIPATION,VOMITING, 

FEVER        

 

             [id number]           2020 19:00:00                        P

EN           

Preadmit           GALI PERSAUD MD                                          

 

INDUCTION

## 2020-05-13 VITALS — DIASTOLIC BLOOD PRESSURE: 62 MMHG | SYSTOLIC BLOOD PRESSURE: 140 MMHG

## 2020-05-13 VITALS — DIASTOLIC BLOOD PRESSURE: 67 MMHG | SYSTOLIC BLOOD PRESSURE: 143 MMHG

## 2020-05-13 VITALS — SYSTOLIC BLOOD PRESSURE: 128 MMHG | DIASTOLIC BLOOD PRESSURE: 71 MMHG

## 2020-05-13 VITALS — SYSTOLIC BLOOD PRESSURE: 144 MMHG | DIASTOLIC BLOOD PRESSURE: 81 MMHG

## 2020-05-13 VITALS — DIASTOLIC BLOOD PRESSURE: 88 MMHG | SYSTOLIC BLOOD PRESSURE: 135 MMHG

## 2020-05-13 VITALS — SYSTOLIC BLOOD PRESSURE: 141 MMHG | DIASTOLIC BLOOD PRESSURE: 75 MMHG

## 2020-05-13 VITALS — SYSTOLIC BLOOD PRESSURE: 137 MMHG | DIASTOLIC BLOOD PRESSURE: 70 MMHG

## 2020-05-13 VITALS — SYSTOLIC BLOOD PRESSURE: 132 MMHG | DIASTOLIC BLOOD PRESSURE: 59 MMHG

## 2020-05-13 VITALS — SYSTOLIC BLOOD PRESSURE: 171 MMHG | DIASTOLIC BLOOD PRESSURE: 80 MMHG

## 2020-05-13 VITALS — SYSTOLIC BLOOD PRESSURE: 145 MMHG | DIASTOLIC BLOOD PRESSURE: 71 MMHG

## 2020-05-13 VITALS — DIASTOLIC BLOOD PRESSURE: 59 MMHG | SYSTOLIC BLOOD PRESSURE: 122 MMHG

## 2020-05-13 VITALS — DIASTOLIC BLOOD PRESSURE: 75 MMHG | SYSTOLIC BLOOD PRESSURE: 147 MMHG

## 2020-05-13 VITALS — SYSTOLIC BLOOD PRESSURE: 119 MMHG | DIASTOLIC BLOOD PRESSURE: 67 MMHG

## 2020-05-13 VITALS — DIASTOLIC BLOOD PRESSURE: 73 MMHG | SYSTOLIC BLOOD PRESSURE: 158 MMHG

## 2020-05-13 VITALS — SYSTOLIC BLOOD PRESSURE: 181 MMHG | DIASTOLIC BLOOD PRESSURE: 89 MMHG

## 2020-05-13 VITALS — SYSTOLIC BLOOD PRESSURE: 140 MMHG | DIASTOLIC BLOOD PRESSURE: 70 MMHG

## 2020-05-13 VITALS — DIASTOLIC BLOOD PRESSURE: 67 MMHG | SYSTOLIC BLOOD PRESSURE: 144 MMHG

## 2020-05-13 VITALS — DIASTOLIC BLOOD PRESSURE: 91 MMHG | SYSTOLIC BLOOD PRESSURE: 178 MMHG

## 2020-05-13 VITALS — DIASTOLIC BLOOD PRESSURE: 91 MMHG | SYSTOLIC BLOOD PRESSURE: 174 MMHG

## 2020-05-13 VITALS — DIASTOLIC BLOOD PRESSURE: 79 MMHG | SYSTOLIC BLOOD PRESSURE: 138 MMHG

## 2020-05-13 VITALS — SYSTOLIC BLOOD PRESSURE: 144 MMHG | DIASTOLIC BLOOD PRESSURE: 77 MMHG

## 2020-05-13 VITALS — DIASTOLIC BLOOD PRESSURE: 75 MMHG | SYSTOLIC BLOOD PRESSURE: 142 MMHG

## 2020-05-13 VITALS — SYSTOLIC BLOOD PRESSURE: 153 MMHG | DIASTOLIC BLOOD PRESSURE: 68 MMHG

## 2020-05-13 VITALS — DIASTOLIC BLOOD PRESSURE: 58 MMHG | SYSTOLIC BLOOD PRESSURE: 126 MMHG

## 2020-05-13 VITALS — DIASTOLIC BLOOD PRESSURE: 69 MMHG | SYSTOLIC BLOOD PRESSURE: 140 MMHG

## 2020-05-13 VITALS — DIASTOLIC BLOOD PRESSURE: 75 MMHG | SYSTOLIC BLOOD PRESSURE: 146 MMHG

## 2020-05-13 VITALS — DIASTOLIC BLOOD PRESSURE: 70 MMHG | SYSTOLIC BLOOD PRESSURE: 116 MMHG

## 2020-05-13 VITALS — SYSTOLIC BLOOD PRESSURE: 146 MMHG | DIASTOLIC BLOOD PRESSURE: 74 MMHG

## 2020-05-13 VITALS — SYSTOLIC BLOOD PRESSURE: 143 MMHG | DIASTOLIC BLOOD PRESSURE: 68 MMHG

## 2020-05-13 VITALS — DIASTOLIC BLOOD PRESSURE: 66 MMHG | SYSTOLIC BLOOD PRESSURE: 137 MMHG

## 2020-05-13 VITALS — DIASTOLIC BLOOD PRESSURE: 83 MMHG | SYSTOLIC BLOOD PRESSURE: 141 MMHG

## 2020-05-13 VITALS — DIASTOLIC BLOOD PRESSURE: 76 MMHG | SYSTOLIC BLOOD PRESSURE: 136 MMHG

## 2020-05-13 VITALS — SYSTOLIC BLOOD PRESSURE: 175 MMHG | DIASTOLIC BLOOD PRESSURE: 83 MMHG

## 2020-05-13 VITALS — SYSTOLIC BLOOD PRESSURE: 172 MMHG | DIASTOLIC BLOOD PRESSURE: 78 MMHG

## 2020-05-13 VITALS — DIASTOLIC BLOOD PRESSURE: 71 MMHG | SYSTOLIC BLOOD PRESSURE: 128 MMHG

## 2020-05-13 VITALS — SYSTOLIC BLOOD PRESSURE: 154 MMHG | DIASTOLIC BLOOD PRESSURE: 71 MMHG

## 2020-05-13 VITALS — DIASTOLIC BLOOD PRESSURE: 81 MMHG | SYSTOLIC BLOOD PRESSURE: 146 MMHG

## 2020-05-13 VITALS — DIASTOLIC BLOOD PRESSURE: 84 MMHG | SYSTOLIC BLOOD PRESSURE: 151 MMHG

## 2020-05-13 VITALS — SYSTOLIC BLOOD PRESSURE: 141 MMHG | DIASTOLIC BLOOD PRESSURE: 73 MMHG

## 2020-05-13 VITALS — SYSTOLIC BLOOD PRESSURE: 144 MMHG | DIASTOLIC BLOOD PRESSURE: 79 MMHG

## 2020-05-13 VITALS — SYSTOLIC BLOOD PRESSURE: 127 MMHG | DIASTOLIC BLOOD PRESSURE: 127 MMHG

## 2020-05-13 VITALS — SYSTOLIC BLOOD PRESSURE: 145 MMHG | DIASTOLIC BLOOD PRESSURE: 86 MMHG

## 2020-05-13 VITALS — SYSTOLIC BLOOD PRESSURE: 148 MMHG | DIASTOLIC BLOOD PRESSURE: 79 MMHG

## 2020-05-13 RX ADMIN — WATER SCH MLS/HR: 1 INJECTION INTRAMUSCULAR; INTRAVENOUS; SUBCUTANEOUS at 00:56

## 2020-05-13 RX ADMIN — NIFEDIPINE SCH MG: 60 TABLET, FILM COATED, EXTENDED RELEASE ORAL at 20:17

## 2020-05-13 RX ADMIN — SODIUM CHLORIDE, SODIUM LACTATE, POTASSIUM CHLORIDE, AND CALCIUM CHLORIDE PRN MLS/HR: 600; 310; 30; 20 INJECTION, SOLUTION INTRAVENOUS at 17:40

## 2020-05-13 RX ADMIN — Medication SCH ML: at 06:00

## 2020-05-13 RX ADMIN — WATER SCH MLS/HR: 1 INJECTION INTRAMUSCULAR; INTRAVENOUS; SUBCUTANEOUS at 05:00

## 2020-05-13 RX ADMIN — OXYCODONE HYDROCHLORIDE AND ACETAMINOPHEN PRN TAB: 10; 325 TABLET ORAL at 20:42

## 2020-05-13 RX ADMIN — MISOPROSTOL PRN MCG: 100 TABLET ORAL at 00:56

## 2020-05-13 RX ADMIN — SODIUM CHLORIDE, SODIUM LACTATE, POTASSIUM CHLORIDE, CALCIUM CHLORIDE, AND DEXTROSE MONOHYDRATE SCH MLS/HR: 600; 310; 30; 20; 5 INJECTION, SOLUTION INTRAVENOUS at 15:52

## 2020-05-13 RX ADMIN — KETOROLAC TROMETHAMINE SCH MG: 30 INJECTION, SOLUTION INTRAMUSCULAR; INTRAVENOUS at 18:45

## 2020-05-13 RX ADMIN — SODIUM CHLORIDE, SODIUM LACTATE, POTASSIUM CHLORIDE, CALCIUM CHLORIDE, AND DEXTROSE MONOHYDRATE SCH MLS/HR: 600; 310; 30; 20; 5 INJECTION, SOLUTION INTRAVENOUS at 03:16

## 2020-05-13 RX ADMIN — DOCUSATE SODIUM SCH MG: 100 CAPSULE ORAL at 20:42

## 2020-05-13 RX ADMIN — SODIUM CHLORIDE, SODIUM LACTATE, POTASSIUM CHLORIDE, AND CALCIUM CHLORIDE PRN MLS/HR: 600; 310; 30; 20 INJECTION, SOLUTION INTRAVENOUS at 16:56

## 2020-05-13 RX ADMIN — ACETAMINOPHEN PRN MG: 500 TABLET ORAL at 16:08

## 2020-05-13 RX ADMIN — WATER SCH MLS/HR: 1 INJECTION INTRAMUSCULAR; INTRAVENOUS; SUBCUTANEOUS at 10:14

## 2020-05-13 RX ADMIN — MISOPROSTOL PRN MCG: 100 TABLET ORAL at 05:00

## 2020-05-13 RX ADMIN — SODIUM CHLORIDE, SODIUM LACTATE, POTASSIUM CHLORIDE, CALCIUM CHLORIDE, AND DEXTROSE MONOHYDRATE SCH MLS/HR: 600; 310; 30; 20; 5 INJECTION, SOLUTION INTRAVENOUS at 07:28

## 2020-05-13 RX ADMIN — WATER SCH MLS/HR: 1 INJECTION INTRAMUSCULAR; INTRAVENOUS; SUBCUTANEOUS at 14:37

## 2020-05-13 RX ADMIN — OXYCODONE HYDROCHLORIDE AND ACETAMINOPHEN PRN TAB: 10; 325 TABLET ORAL at 21:55

## 2020-05-13 RX ADMIN — Medication SCH ML: at 15:55

## 2020-05-13 RX ADMIN — NIFEDIPINE SCH MG: 60 TABLET, FILM COATED, EXTENDED RELEASE ORAL at 10:18

## 2020-05-13 NOTE — NUR
Pt. to rm 308 via bed from recovery following p c/s per DANIELLA Syed RN, oriented to rm, call 
light given, SCDs on, POC reviewed, pt. verbalized understanding, denies pain or needs.  
Report rc'd from DANIELLA Syed, recovery RN.

## 2020-05-13 NOTE — OPERATIVE REPORT
DATE OF SERVICE:  2020



PREOPERATIVE DIAGNOSES:

Term pregnancy at 37 weeks' gestation in labor with pregnancy-induced

hypertension and failure to progress.



POSTOPERATIVE DIAGNOSES:

Term pregnancy at 37 weeks' gestation in labor with pregnancy-induced

hypertension and failure to progress.



OPERATIVE PROCEDURE:

Primary low transverse  delivery of a viable male infant with Apgars of

8 and 9 at 1 and 5 minutes respectively.  Weight 7 pounds.  Cord blood pH of

7.34 and a birth time of 1829.



OPERATIVE DESCRIPTION:

With the patient in the supine position under satisfactory spinal analgesia. 

She was prepped and draped in the usual fashion for abdominal surgery.  Huffman

catheter was placed in the urinary bladder.



A Pfannenstiel incision was made through the skin with a scalpel.  The patient's

abdomen entered in the usual manner.  Bladder retractor was placed in position

and clean scalpel was used to make a 4 cm hysterotomy incision transversely

across the lower uterine segment.  That was extended by blunt dissection as

well.  The presenting part was vertex very high in the uterine cavity.  Magallanes

forceps were applied to facilitate the delivery of the vigorous viable male

infant.  Infant had stats as noted above.  Infant was bulb suctioned on delivery

of the head on completion of delivery.  The umbilical cord was doubly clamped

and cut and the infant passed to the pediatrician in attendance for delivery.



Cord blood was obtained.  The placenta delivered spontaneously Martines.  It was

normal with a 3-vessel cord.  The uterus was exteriorized.  The interior wiped

clean with a wet laparotomy sponge.  Uterine incision closed with a running

locked suture of 2-0 Vicryl.  Hemostasis was satisfactory.  The uterus was

returned to the abdominal cavity.  All blood clot and debris removed from the

abdominal cavity.  Sponge and needle counts were correct and hemostasis was now

assured.  The anterior parietal peritoneum was closed with running suture of 2-0

Vicryl.  Rectus muscles were closed with that sutures as well.  The rectus

fascia was closed with 2-0 Vicryl.  Subcutaneous tissue was closed with 2-0

Vicryl and the skin was stapled.



Sponge and needle counts were correct on completion of the procedure.  Estimated

blood loss was 400 mL.  The patient tolerated the procedure well and was

transferred to the recovery room in stable condition.  The infant had been taken

stable to the full term nursery under the care of the pediatrician.





Job ID: [id number]

DocumentID: [id number]

Dictated Date:  2020 18:55:02

Transcription Date: 2020 19:26:02

Dictated By: HALEIGH GILLETTE MD

## 2020-05-13 NOTE — LABOR PROGRESS NOTE
Labor Progress Note


Labor Progress Note


Date Seen by Provider:  May 13, 2020


Time Seen by Provider:  16:30


Subjective:


Pt is tired, having pain with contractions.





Objective:


Cervical exam: 1.5/thick/high


Consistency: soft


Position: anterior


Presentation: vertex


Fetal heart tones: 130 beats per minute, minimal variability, no decels


Tocometer: 5 ctx/10 minutes





Assessment/Plan:


Zhane Hernandez  is a 24  /Para  1 / 0,Gestational Age (wks)37 here for 

induction of labor due to worsening chronic hypertension. No cervical change in 

spite of cytotec overnight, pitocin all day and angelita since 6 am. BP 

increasing, required 2 doses of IV labetalol in last hour and has new onset 

headache. Discussed with patient that I suspect she needs  given the 

above, and she is in agreement. Discussed with Dr. Lion who also agrees 

and OR crew notified.





Vitals - Labs


Vital Signs - I&O





Vital Signs








  Date Time  Temp Pulse Resp B/P (MAP) Pulse Ox O2 Delivery O2 Flow Rate FiO2


 


20 15:50  84 18 172/78 (109)  Room Air  


 


20 15:45  82 18 175/83 (113)  Room Air  


 


20 15:35  83 18 171/80 (110)  Room Air  


 


20 15:20  78 18 174/91 (118)  Room Air  


 


20 15:05  82 18 181/89 (119)  Room Air  


 


20 14:50  78 18 145/71 (95)  Room Air  


 


20 14:35  88 18 145/71 (95)  Room Air  


 


20 14:20  80 18 151/84 (106)  Room Air  


 


20 14:05  79 18 145/86 (105)  Room Air  


 


20 13:50  85 18 144/79 (100)  Room Air  


 


20 13:35  93 18 141/83 (102)  Room Air  


 


20 13:20   18   Room Air  


 


20 13:05  91 18 141/75 (97)  Room Air  


 


20 12:50  90 18 135/88 (104)  Room Air  


 


20 12:35   18   Room Air  


 


20 12:20   18   Room Air  


 


20 12:14 36.3       


 


5/13/20 12:05  76 18 132/59 (83)  Room Air  


 


20 11:50  75 18 126/58 (80)  Room Air  


 


20 11:35  82 18 140/70 (93)  Room Air  


 


20 11:20  81 18 158/73 (101)  Room Air  


 


20 11:00  82 18 146/74 (98)  Room Air  


 


20 10:45  81 18 147/75 (99)  Room Air  


 


20 10:30  76 18 144/81 (102)  Room Air  


 


20 10:00  81 18 148/79 (102)  Room Air  


 


20 09:00  77 18 140/69 (92)  Room Air  


 


20 08:00  76 18 142/75 (97)  Room Air  


 


20 07:30 35.9       


 


20 06:05  82 16 138/79 (98)  Room Air  


 


20 05:05 36.5 79 16 137/66 (89)  Room Air  


 


20 04:05  85 16 122/59 (80)  Room Air  


 


20 03:10  87 16 144/77 (99)  Room Air  


 


20 02:05  83 16 141/73 (95)  Room Air  


 


20 01:05 36.2 91 16 146/75 (98)  Room Air  


 


20 00:05  88 16 136/76 (96)  Room Air  


 


20 23:00 36.6 96 16 133/63 (86)  Room Air  


 


20 22:30  93 16 149/69 (95)  Room Air  


 


20 22:00  100 16 142/75 (97)  Room Air  


 


20 21:30  97 16 140/71 (94)  Room Air  


 


20 21:00 36.6 96 18 144/67 (92) 98 Room Air  


 


20 19:40  116 16 141/68 (92)  Room Air  


 


20 19:25 36.6 115 18 181/97 (125) 98 Room Air  


 


20 19:25 36.6 115 18  98 Room Air  














I & O 


 


 20





 07:00


 


Intake Total 22.2 ml


 


Balance 22.2 ml











Labs


Laboratory Tests


5/12/20 19:00: 


Urine Protein 24H, Urine Creatinine 149H, Urine Protein/Creatinine Ratio 0.16


20 19:30: 


White Blood Count 12.1H, Red Blood Count 4.19L, Hemoglobin 11.3L, Hematocrit 34L

, Mean Corpuscular Volume 81, Mean Corpuscular Hemoglobin 27, Mean Corpuscular 

Hemoglobin Concent 33, Red Cell Distribution Width 14.6H, Platelet Count 295, 

Mean Platelet Volume 11.8H, Neutrophils (%) (Auto) 76H, Lymphocytes (%) (Auto) 

18, Monocytes (%) (Auto) 6, Eosinophils (%) (Auto) 0, Basophils (%) (Auto) 0, 

Neutrophils # (Auto) 9.2H, Lymphocytes # (Auto) 2.2, Monocytes # (Auto) 0.7, 

Eosinophils # (Auto) 0.0, Basophils # (Auto) 0.0, Sodium Level 137, Potassium 

Level 4.7, Chloride Level 106, Carbon Dioxide Level 16L, Anion Gap 15H, Blood 

Urea Nitrogen 8, Creatinine 0.73, Estimat Glomerular Filtration Rate > 60, 

BUN/Creatinine Ratio 11, Glucose Level 116H, Uric Acid 6.0, Calcium Level 9.7, 

Corrected Calcium 10.3H, Total Bilirubin 0.1, Aspartate Amino Transf (AST/SGOT) 

15, Alanine Aminotransferase (ALT/SGPT) 7, Alkaline Phosphatase 105, Lactate 

Dehydrogenase 267H, Total Protein 7.0, Albumin 3.2











GALI PERSAUD MD             May 13, 2020 17:00

## 2020-05-13 NOTE — HISTORY & PHYSICAL-OB
OB - Chief Complaint & HPI


Date/Time


Date of Admission:


Date of Admission:  May 12, 2020 at 18:27


Date seen by a Provider:  May 13, 2020


Time Seen by a Provider:  07:48





Chief Complaint/History


OB-Reason for Admission/Chief:  Medical Complication


Hx :  1


Hx Para:  0


Expected Date of Delivery:  2020


Gestational Age in Weeks:  37


Gestational Age in Days:  2


Indication for induction:  medical complication


Admission Nurse Assessment Rev:  Yes


History of Labs


A+, antibody neg, RI. HIV/HepB/RPR NR. GC/chlamydia neg. 1 hour and 3 hour 

glucola neg. GBS pos.


Other


Chronic hypertension complicating pregnancy, requiring increasing doses of 

nifedipine over last few weeks, preeclampsia labs weekly negative up until time 

of admit. Weekly BPP normal, growth at 2 weeks advanced, EFW 7#5 oz on .





Allergies and Home Medications


Allergies


Coded Allergies:  


     No Known Drug Allergies (Unverified , 19)





Home Medications


Nifedipine 10 Mg Capsule, 90 MG PO DAILY, (Reported)


Lqy375/Iron Fumarate/FA/Dss 1 Each Tablet, 1 EACH PO DAILY, (Reported)





Patient Home Medication List


Home Medication List Reviewed:  Yes





OB - History


Hx of Present Pregnancy


Prenatal Care:  Yes


Ultrasounds:  Normal mid trimester US


Obstetrical Complications:  Other (chronic hypertension, difficult to control, 

severe headache, obesity)





Prenatal Information


Pregnancy Induced Hypertension:  Yes (chronic hypertension)


Maternal Gestational Diabetes:  No


Postpartum Hemorrhage:  No





Obstetrical History


Hx :  1


Hx Para:  0


Hx # Term Pregnancies:  0


Hx #  Pregnancies:  0


Number of Living Children:  0


Hx Pregnancy Termination:  No


Hx Multiple Gestation:  No


Hx Ectopic Pregnancy:  No


Hx Stillbirth:  No


Hx Pregnancy Complication:  No


Hx Pregnancy Induced Hypertens:  Yes (chronic hypertension)


Hx Maternal Gestational Diabet:  No


Hx Postpartum Hemorrhage:  No





Delivery History


Hx Dystocia:  No


Hx Forceps Assisted Delivery:  No


Hx Vacuum Extraction Assisted:  No


Hx Placenta Abnormality:  No


Hx Fetal Distress:  No


Hx Large For Gestational Age I:  No


Hx Small for Gestational Age I:  No


Hx  Section:  No


Hx Vaginal Delivery Post C-Sec:  No


Hx Blood Disorders:  No


Adverse Rxn to Tranfusion:  No





Patient Past Medical History





PMHx:


Hypertension





SurgHx:Tonsillectomy/adenoidectomy





Social History/Family History


HIV/AIDS:  No


Sexually Transmitted Disease:  No


Alcohol Use:  Denies Use


Recreational Drug Use:  No


Smoking Cessation:  Former smoker


2nd Hand Smoke Exposure:  No





Immunizations


Tetanus Booster (TDap):  Less than 5yrs (3/25/2020)


Rubella:  immune


RPR/VDRL:  Negative


GBS Status:  Positive


HBsAG:  Negative





OB - Admission Exam


Physical Exam


Vitals:





Vital Signs








 20





 21:00 05:05 06:05


 


Temp  36.5 


 


Pulse   82


 


Resp   16


 


B/P (MAP)   138/79 (98)


 


Pulse Ox 98  


 


O2 Delivery   Room Air








HEENT:  NCAT


Abdomen:  Non tender


Extremities:  Edema


Cervical Dilatation:  1cm


Effacement:  0%


Station:  Ballotable


Membranes:  Intact


Accelerations:  Accelerations Present


Decelerations:  No Decelerations


Contractions on Admission:  None





Aldana Scoring Tool (Modified)


Dilation (cm):  1-2cm (1)


Effacement (%):  0-30%    (0)


Fetal Descent/Station:  -3        (0)


Cervix Consistency:  Soft      (2)


Cervix Position:  Anterior   (2)


Subtract 1 point for:  Nulliparity (-1)


Aldana Score:  4


Labs





Laboratory Tests








Test


 20


19:00 20


19:30 Range/Units


 


 


Urine Protein 24 H  6-12  MG/DL


 


Urine Creatinine 149 H    MG/DL


 


Urine Protein/Creatinine Ratio 0.16    


 


White Blood Count


 


 12.1 H


 4.3-11.0


10^3/uL


 


Red Blood Count


 


 4.19 L


 4.35-5.85


10^6/uL


 


Hemoglobin  11.3 L 11.5-16.0  G/DL


 


Hematocrit  34 L 35-52  %


 


Mean Corpuscular Volume  81  80-99  FL


 


Mean Corpuscular Hemoglobin  27  25-34  PG


 


Mean Corpuscular Hemoglobin


Concent 


 33 


 32-36  G/DL





 


Red Cell Distribution Width  14.6 H 10.0-14.5  %


 


Platelet Count


 


 295 


 130-400


10^3/uL


 


Mean Platelet Volume  11.8 H 7.4-10.4  FL


 


Neutrophils (%) (Auto)  76 H 42-75  %


 


Lymphocytes (%) (Auto)  18  12-44  %


 


Monocytes (%) (Auto)  6  0-12  %


 


Eosinophils (%) (Auto)  0  0-10  %


 


Basophils (%) (Auto)  0  0-10  %


 


Neutrophils # (Auto)  9.2 H 1.8-7.8  X 10^3


 


Lymphocytes # (Auto)  2.2  1.0-4.0  X 10^3


 


Monocytes # (Auto)  0.7  0.0-1.0  X 10^3


 


Eosinophils # (Auto)


 


 0.0 


 0.0-0.3


10^3/uL


 


Basophils # (Auto)


 


 0.0 


 0.0-0.1


10^3/uL


 


Sodium Level  137  135-145  MMOL/L


 


Potassium Level  4.7  3.6-5.0  MMOL/L


 


Chloride Level  106    MMOL/L


 


Carbon Dioxide Level  16 L 21-32  MMOL/L


 


Anion Gap  15 H 5-14  MMOL/L


 


Blood Urea Nitrogen  8  7-18  MG/DL


 


Creatinine


 


 0.73 


 0.60-1.30


MG/DL


 


Estimat Glomerular Filtration


Rate 


 > 60 


  





 


BUN/Creatinine Ratio  11   


 


Glucose Level  116 H   MG/DL


 


Uric Acid  6.0  2.6-7.2  MG/DL


 


Calcium Level  9.7  8.5-10.1  MG/DL


 


Corrected Calcium  10.3 H 8.5-10.1  MG/DL


 


Total Bilirubin  0.1  0.1-1.0  MG/DL


 


Aspartate Amino Transf


(AST/SGOT) 


 15 


 5-34  U/L





 


Alanine Aminotransferase


(ALT/SGPT) 


 7 


 0-55  U/L





 


Alkaline Phosphatase  105    U/L


 


Lactate Dehydrogenase  267 H 125-220  U/L


 


Total Protein  7.0  6.4-8.2  GM/DL


 


Albumin  3.2  3.2-4.5  GM/DL











OB - Assessment/Plan/Diagnosis


Assessment


Assessment:  group B positive strep, induction of labor


Admission Dx


Induction of labor due to worsening chronic hypertension complicating pregnancy


37 weeks gestation


GBS pos


Admission Status:  Inpatient Order (span 2 midnights)


Reason for Inpatient Admission:  


Induction, labor, delivery and postpartum course





Plan


Plan:  Induction


Induction Method:  per Misoprostol Protocol











GALI PERSAUD MD             May 13, 2020 07:50

## 2020-05-14 VITALS — SYSTOLIC BLOOD PRESSURE: 128 MMHG | DIASTOLIC BLOOD PRESSURE: 79 MMHG

## 2020-05-14 VITALS — SYSTOLIC BLOOD PRESSURE: 130 MMHG | DIASTOLIC BLOOD PRESSURE: 77 MMHG

## 2020-05-14 VITALS — SYSTOLIC BLOOD PRESSURE: 138 MMHG | DIASTOLIC BLOOD PRESSURE: 74 MMHG

## 2020-05-14 VITALS — DIASTOLIC BLOOD PRESSURE: 74 MMHG | SYSTOLIC BLOOD PRESSURE: 133 MMHG

## 2020-05-14 VITALS — DIASTOLIC BLOOD PRESSURE: 78 MMHG | SYSTOLIC BLOOD PRESSURE: 130 MMHG

## 2020-05-14 VITALS — SYSTOLIC BLOOD PRESSURE: 147 MMHG | DIASTOLIC BLOOD PRESSURE: 83 MMHG

## 2020-05-14 RX ADMIN — Medication SCH MLS/HR: at 00:22

## 2020-05-14 RX ADMIN — Medication SCH MLS/HR: at 00:16

## 2020-05-14 RX ADMIN — Medication SCH ML: at 00:17

## 2020-05-14 RX ADMIN — OXYCODONE HYDROCHLORIDE AND ACETAMINOPHEN PRN TAB: 10; 325 TABLET ORAL at 02:32

## 2020-05-14 RX ADMIN — DOCUSATE SODIUM SCH MG: 100 CAPSULE ORAL at 20:27

## 2020-05-14 RX ADMIN — OXYCODONE HYDROCHLORIDE AND ACETAMINOPHEN PRN TAB: 10; 325 TABLET ORAL at 11:17

## 2020-05-14 RX ADMIN — Medication SCH ML: at 06:45

## 2020-05-14 RX ADMIN — NIFEDIPINE SCH MG: 60 TABLET, FILM COATED, EXTENDED RELEASE ORAL at 08:15

## 2020-05-14 RX ADMIN — IBUPROFEN SCH MG: 800 TABLET, FILM COATED ORAL at 18:14

## 2020-05-14 RX ADMIN — Medication SCH ML: at 14:06

## 2020-05-14 RX ADMIN — IBUPROFEN SCH MG: 800 TABLET, FILM COATED ORAL at 12:36

## 2020-05-14 RX ADMIN — KETOROLAC TROMETHAMINE SCH MG: 30 INJECTION, SOLUTION INTRAMUSCULAR; INTRAVENOUS at 06:45

## 2020-05-14 RX ADMIN — DOCUSATE SODIUM SCH MG: 100 CAPSULE ORAL at 08:15

## 2020-05-14 RX ADMIN — OXYCODONE HYDROCHLORIDE AND ACETAMINOPHEN PRN TAB: 10; 325 TABLET ORAL at 20:27

## 2020-05-14 RX ADMIN — KETOROLAC TROMETHAMINE SCH MG: 30 INJECTION, SOLUTION INTRAMUSCULAR; INTRAVENOUS at 00:21

## 2020-05-14 RX ADMIN — KETOROLAC TROMETHAMINE SCH MG: 30 INJECTION, SOLUTION INTRAMUSCULAR; INTRAVENOUS at 11:26

## 2020-05-14 NOTE — DISCHARGE INST-SURGICAL
Discharge Inst-Surgical


Depart Medication/Instructions


New, Converted or Re-Newed RX:  RX on Chart





Consults/Follow Up


Patient Instructions:  


As directed


Orders & Referrals





Follow Up Appt:


RTC 1 week for incision check with Dr. Lion


Call to make follow up appt. for patient in 6 weeks   With Dr. Rivera





Wound Care:


Remove staples, apply benzoin and steri strips.





Activity 


Per routine post  instructions.





Please call in RX to patient pharmacy.





Diet as tolerated





Patient may shower or tub bathe as desired.





Continue home meds





Activity


Activity as Tolerated:  No





Diet


Discharge Diet:  No Restrictions











HALEIGH LION MD       May 14, 2020 07:50

## 2020-05-14 NOTE — PROGRESS NOTE
Standard Progress Note


Progress Notes/Assess & Plan


Date Seen by a Provider:  May 14, 2020


Time Seen by a Provider:  07:46


Progress/Assessment & Plan


This patient is without complaint.  She is ambulating, voiding, tolerating oral 

intake well and has good pain control.  Patient denies chest pain, denies 

shortness of breath, denies nausea or vomiting, and denies headache.








Vital Signs








  Date Time  Temp Pulse Resp B/P (MAP) Pulse Ox O2 Delivery O2 Flow Rate FiO2


 


20 03:30 37.0 91 18 128/79 (95) 98 Room Air  


 


20 00:21 36.8 82 18 138/74 (95) 98 Room Air  


 


20 19:45 36.0  20 136/72 (93) 98 Room Air  


 


20 19:45      Room Air  


 


20 19:45 36.7 71 18 119/67 (84) 100 Room Air  


 


20 19:30      Room Air  


 


20 19:30   20 137/70 (92) 98 Room Air  


 


20 19:20   20 128/71 (90) 99 Room Air  


 


20 19:15      Room Air  


 


20 19:10   20 127/127 (127) 65 Room Air  


 


20 19:00   20 128/71 (90) 99 Room Air  


 


20 19:00      Room Air  


 


20 18:55 36.0  20 116/70 (85) 99 Room Air  


 


20 18:55      Room Air  


 


20 17:50  87 18  99 Room Air  


 


20 17:40 37.7 86 18  99 Room Air  


 


20 17:35  80 18 140/62 (88)  Room Air  


 


20 17:20  80 18 143/67 (92)  Room Air  


 


20 17:05  88 18 143/68 (93)  Room Air  


 


20 16:55  92 18 144/67 (92)  Room Air  


 


20 16:35  83 18 178/91 (120)  Room Air  


 


20 16:20  76 18 146/81 (102)  Room Air  


 


20 16:07  90  154/71 (98)    


 


20 16:05  88 18 153/68 (96)  Room Air  


 


20 15:50  84 18 172/78 (109)  Room Air  


 


20 15:45  82 18 175/83 (113)  Room Air  


 


20 15:35  83 18 171/80 (110)  Room Air  


 


20 15:20  78 18 174/91 (118)  Room Air  


 


20 15:05  82 18 181/89 (119)  Room Air  


 


20 14:50  78 18 145/71 (95)  Room Air  


 


20 14:35  88 18 145/71 (95)  Room Air  


 


20 14:20  80 18 151/84 (106)  Room Air  


 


20 14:05  79 18 145/86 (105)  Room Air  


 


20 13:50  85 18 144/79 (100)  Room Air  


 


20 13:35  93 18 141/83 (102)  Room Air  


 


20 13:20   18   Room Air  


 


20 13:05  91 18 141/75 (97)  Room Air  


 


20 12:50  90 18 135/88 (104)  Room Air  


 


20 12:35   18   Room Air  


 


20 12:20   18   Room Air  


 


20 12:14 36.3       


 


20 12:05  76 18 132/59 (83)  Room Air  


 


20 11:50  75 18 126/58 (80)  Room Air  


 


20 11:35  82 18 140/70 (93)  Room Air  


 


20 11:20  81 18 158/73 (101)  Room Air  


 


20 11:00  82 18 146/74 (98)  Room Air  


 


20 10:45  81 18 147/75 (99)  Room Air  


 


20 10:30  76 18 144/81 (102)  Room Air  


 


20 10:00  81 18 148/79 (102)  Room Air  


 


20 09:00  77 18 140/69 (92)  Room Air  


 


20 08:00  76 18 142/75 (97)  Room Air  














I & O 


 


 20





 07:00


 


Intake Total 4057.4 ml


 


Output Total 500 ml


 


Balance 3557.4 ml





Vital signs are stable.  Patient is afebrile.  Blood pressures are normalizing.





The abdomen is benign.


Extremities show no clubbing or cyanosis.  There is no Homans sign.





Assessment and plan   postoperative day number 1 status post primary  

delivery at 37+ weeks gestation.  Patient is doing well and will have routine 

convalescence care











HALEIGH GILLETTE MD       May 14, 2020 07:48

## 2020-05-14 NOTE — ANESTHESIA-REGIONAL POST-OP
Regional


Patient Condition


Mental Status:  Alert, Oriented x3


Circulation:  Same as Pre-Op


Headache:  Absent


Sensation:  Full Recovery


Motor Block:  Absent





Post Op Complications


Complications


None





Follow Up Care/Instructions


Patient Instructions


None needed.





Anesthesia/Patient Condition


Patient is doing well, no complaints, stable vital signs, no apparent adverse 

anesthesia problems.   


No complications reported per nursing.











THEO ENGLISH CRNA              May 14, 2020 14:10

## 2020-05-15 VITALS — DIASTOLIC BLOOD PRESSURE: 83 MMHG | SYSTOLIC BLOOD PRESSURE: 135 MMHG

## 2020-05-15 VITALS — DIASTOLIC BLOOD PRESSURE: 84 MMHG | SYSTOLIC BLOOD PRESSURE: 137 MMHG

## 2020-05-15 RX ADMIN — IBUPROFEN SCH MG: 800 TABLET, FILM COATED ORAL at 09:30

## 2020-05-15 RX ADMIN — IBUPROFEN SCH MG: 800 TABLET, FILM COATED ORAL at 16:14

## 2020-05-15 RX ADMIN — OXYCODONE HYDROCHLORIDE AND ACETAMINOPHEN PRN TAB: 10; 325 TABLET ORAL at 02:30

## 2020-05-15 RX ADMIN — NIFEDIPINE SCH MG: 60 TABLET, FILM COATED, EXTENDED RELEASE ORAL at 09:30

## 2020-05-15 RX ADMIN — IBUPROFEN SCH MG: 800 TABLET, FILM COATED ORAL at 02:30

## 2020-05-15 RX ADMIN — ACETAMINOPHEN PRN MG: 500 TABLET ORAL at 09:29

## 2020-05-15 RX ADMIN — OXYCODONE HYDROCHLORIDE AND ACETAMINOPHEN PRN TAB: 10; 325 TABLET ORAL at 09:59

## 2020-05-15 RX ADMIN — DOCUSATE SODIUM SCH MG: 100 CAPSULE ORAL at 09:30

## 2020-05-15 NOTE — NUR
Staples to lower transverse incision removed by this nurse, steristrips applied to incision. 
Incision remains intact, no bleeding or oozing noted. Incision care discussed with patient.

Entire discharge packet discussed with patient and her s/o. patient positively engaging in 
discharge discussion and education. Follow up appointments made for patient. patient given 
follow up appointment cards. after going over discharge paper work patient denies having any 
further questions. 

Patient is discharged but is rooming in with baby who is still a patient.

## 2020-05-15 NOTE — PROGRESS NOTE
Standard Progress Note


Progress Notes/Assess & Plan


Date Seen by a Provider:  May 15, 2020


Time Seen by a Provider:  07:56


Progress/Assessment & Plan


This patient is without complaint.  She is ambulating, voiding, tolerating oral 

intake well and has good pain control.  Patient denies chest pain, denies 

shortness of breath, denies nausea or vomiting, and denies headache.








Vital Signs








  Date Time  Temp Pulse Resp B/P (MAP) Pulse Ox O2 Delivery O2 Flow Rate FiO2


 


20 03:30 37.0 91 18 128/79 (95) 98 Room Air  


 


20 00:21 36.8 82 18 138/74 (95) 98 Room Air  


 


20 19:45 36.0  20 136/72 (93) 98 Room Air  


 


20 19:45      Room Air  


 


20 19:45 36.7 71 18 119/67 (84) 100 Room Air  


 


20 19:30      Room Air  


 


20 19:30   20 137/70 (92) 98 Room Air  


 


20 19:20   20 128/71 (90) 99 Room Air  


 


20 19:15      Room Air  


 


20 19:10   20 127/127 (127) 65 Room Air  


 


20 19:00   20 128/71 (90) 99 Room Air  


 


20 19:00      Room Air  


 


20 18:55 36.0  20 116/70 (85) 99 Room Air  


 


20 18:55      Room Air  


 


20 17:50  87 18  99 Room Air  


 


20 17:40 37.7 86 18  99 Room Air  


 


20 17:35  80 18 140/62 (88)  Room Air  


 


20 17:20  80 18 143/67 (92)  Room Air  


 


20 17:05  88 18 143/68 (93)  Room Air  


 


20 16:55  92 18 144/67 (92)  Room Air  


 


20 16:35  83 18 178/91 (120)  Room Air  


 


20 16:20  76 18 146/81 (102)  Room Air  


 


20 16:07  90  154/71 (98)    


 


20 16:05  88 18 153/68 (96)  Room Air  


 


20 15:50  84 18 172/78 (109)  Room Air  


 


20 15:45  82 18 175/83 (113)  Room Air  


 


20 15:35  83 18 171/80 (110)  Room Air  


 


20 15:20  78 18 174/91 (118)  Room Air  


 


20 15:05  82 18 181/89 (119)  Room Air  


 


20 14:50  78 18 145/71 (95)  Room Air  


 


20 14:35  88 18 145/71 (95)  Room Air  


 


20 14:20  80 18 151/84 (106)  Room Air  


 


20 14:05  79 18 145/86 (105)  Room Air  


 


20 13:50  85 18 144/79 (100)  Room Air  


 


20 13:35  93 18 141/83 (102)  Room Air  


 


20 13:20   18   Room Air  


 


20 13:05  91 18 141/75 (97)  Room Air  


 


20 12:50  90 18 135/88 (104)  Room Air  


 


20 12:35   18   Room Air  


 


20 12:20   18   Room Air  


 


20 12:14 36.3       


 


20 12:05  76 18 132/59 (83)  Room Air  


 


20 11:50  75 18 126/58 (80)  Room Air  


 


20 11:35  82 18 140/70 (93)  Room Air  


 


20 11:20  81 18 158/73 (101)  Room Air  


 


20 11:00  82 18 146/74 (98)  Room Air  


 


20 10:45  81 18 147/75 (99)  Room Air  


 


20 10:30  76 18 144/81 (102)  Room Air  


 


20 10:00  81 18 148/79 (102)  Room Air  


 


20 09:00  77 18 140/69 (92)  Room Air  


 


20 08:00  76 18 142/75 (97)  Room Air  














I & O 


 


 20





 07:00


 


Intake Total 4057.4 ml


 


Output Total 500 ml


 


Balance 3557.4 ml





Vital signs are stable.  Patient is afebrile.  Blood pressures are normalizing.





The abdomen is benign.


Extremities show no clubbing or cyanosis.  There is no Homans sign.





Assessment and plan   postoperative day number 1 status post primary  

delivery at 37+ weeks gestation.  Patient is doing well and will have routine 

convalescence care





May 15, 2020


Patient without complaint.  She is ambulating, voiding, tolerating oral intake 

well has good pain control.








Vital Signs








  Date Time  Temp Pulse Resp B/P (MAP) Pulse Ox O2 Delivery O2 Flow Rate FiO2


 


5/15/20 02:30 36.5 89 18 137/84 (101) 97 Room Air  


 


20 20:27 36.3 94 16 133/74 (93) 97 Room Air  


 


20 16:00 36.8 101 16 130/78 (95)  Room Air  


 


20 12:34 36.8 96 18 130/77 (94) 95 Room Air  


 


20 08:00 36.9 89 18 147/83 (104) 97 Room Air  














I & O 


 


 5/15/20





 07:00


 


Intake Total 600 ml


 


Balance 600 ml





Vital signs are stable.  Patient is afebrile.  Blood pressures are normalizing.





The abdomen is benign.


Extremities show no clubbing or cyanosis.  There is no Homans sign.





Assessment and plan   postoperative day number 2 doing well.  Plan is for 

discharge home with follow-up in clinic


Final Diagnosis


37+ week primary  delivery











HALEIGH GILLETTE MD       May 15, 2020 07:57

## 2020-05-15 NOTE — NUR
assessment completed, see interventions for further.  POC reviewed with pt and s/o.  pt 
pumping currently, will return when notified to assess  VS.

## 2020-08-05 ENCOUNTER — HOSPITAL ENCOUNTER (OUTPATIENT)
Dept: HOSPITAL 75 - PREOP | Age: 25
Discharge: HOME | End: 2020-08-05
Attending: SURGERY
Payer: COMMERCIAL

## 2020-08-05 VITALS — WEIGHT: 293 LBS | HEIGHT: 69.02 IN | BODY MASS INDEX: 43.4 KG/M2

## 2020-08-05 DIAGNOSIS — Z01.818: Primary | ICD-10-CM

## 2020-08-12 ENCOUNTER — HOSPITAL ENCOUNTER (OUTPATIENT)
Dept: HOSPITAL 75 - SDC | Age: 25
Discharge: HOME | End: 2020-08-12
Attending: SURGERY
Payer: COMMERCIAL

## 2020-08-12 VITALS — DIASTOLIC BLOOD PRESSURE: 77 MMHG | SYSTOLIC BLOOD PRESSURE: 138 MMHG

## 2020-08-12 VITALS — DIASTOLIC BLOOD PRESSURE: 73 MMHG | SYSTOLIC BLOOD PRESSURE: 131 MMHG

## 2020-08-12 VITALS — DIASTOLIC BLOOD PRESSURE: 82 MMHG | SYSTOLIC BLOOD PRESSURE: 127 MMHG

## 2020-08-12 VITALS — SYSTOLIC BLOOD PRESSURE: 142 MMHG | DIASTOLIC BLOOD PRESSURE: 91 MMHG

## 2020-08-12 VITALS — DIASTOLIC BLOOD PRESSURE: 82 MMHG | SYSTOLIC BLOOD PRESSURE: 131 MMHG

## 2020-08-12 VITALS — DIASTOLIC BLOOD PRESSURE: 90 MMHG | SYSTOLIC BLOOD PRESSURE: 139 MMHG

## 2020-08-12 VITALS — DIASTOLIC BLOOD PRESSURE: 65 MMHG | SYSTOLIC BLOOD PRESSURE: 122 MMHG

## 2020-08-12 VITALS — SYSTOLIC BLOOD PRESSURE: 139 MMHG | DIASTOLIC BLOOD PRESSURE: 90 MMHG

## 2020-08-12 VITALS — HEIGHT: 69.02 IN | WEIGHT: 293 LBS | BODY MASS INDEX: 43.4 KG/M2

## 2020-08-12 VITALS — SYSTOLIC BLOOD PRESSURE: 127 MMHG | DIASTOLIC BLOOD PRESSURE: 72 MMHG

## 2020-08-12 VITALS — SYSTOLIC BLOOD PRESSURE: 142 MMHG | DIASTOLIC BLOOD PRESSURE: 82 MMHG

## 2020-08-12 VITALS — DIASTOLIC BLOOD PRESSURE: 67 MMHG | SYSTOLIC BLOOD PRESSURE: 133 MMHG

## 2020-08-12 DIAGNOSIS — I10: ICD-10-CM

## 2020-08-12 DIAGNOSIS — Z79.899: ICD-10-CM

## 2020-08-12 DIAGNOSIS — F41.9: ICD-10-CM

## 2020-08-12 DIAGNOSIS — F32.9: ICD-10-CM

## 2020-08-12 DIAGNOSIS — E66.01: ICD-10-CM

## 2020-08-12 DIAGNOSIS — F17.210: ICD-10-CM

## 2020-08-12 DIAGNOSIS — Z80.1: ICD-10-CM

## 2020-08-12 DIAGNOSIS — K80.10: Primary | ICD-10-CM

## 2020-08-12 PROCEDURE — 76000 FLUOROSCOPY <1 HR PHYS/QHP: CPT

## 2020-08-12 PROCEDURE — 84703 CHORIONIC GONADOTROPIN ASSAY: CPT

## 2020-08-12 PROCEDURE — 88304 TISSUE EXAM BY PATHOLOGIST: CPT

## 2020-08-12 PROCEDURE — 87081 CULTURE SCREEN ONLY: CPT

## 2020-08-12 RX ADMIN — PROMETHAZINE HYDROCHLORIDE ONE MG: 25 INJECTION INTRAMUSCULAR; INTRAVENOUS at 10:33

## 2020-08-12 RX ADMIN — ONDANSETRON PRN MG: 2 INJECTION, SOLUTION INTRAMUSCULAR; INTRAVENOUS at 09:28

## 2020-08-12 RX ADMIN — ONDANSETRON PRN MG: 2 INJECTION, SOLUTION INTRAMUSCULAR; INTRAVENOUS at 09:38

## 2020-08-12 NOTE — NUR
TO AMB SURG FROM PAR PER CART. ALERT, C/O NAUSEA, RATES MID UPPER ABD PAIN 4.  SKIN AFFIX 
INTACT TO X4 LAP ABD SURGICAL SITES, ICE PACK ON.

## 2020-08-12 NOTE — NUR
HAS BEEN RESTING QUIETLY IN BED, AWAKE NOW AND HAS BEEN TAKING PO FLUIDS WITHOUT PROBLEM. 
RATES ABD/SURGICAL SITE PAIN 3. STATES SHE IS READY FOR DISMISSAL.

## 2020-08-12 NOTE — XMS REPORT
Continuity of Care Document

                             Created on: 2020



Zhane Hernandez

External Reference #: [id number]

: 1995

Sex: Female



Demographics





                          Address                   [address]  [address]

 

                          Home Phone                [phone] x

 

                          Preferred Language        Unknown

 

                          Marital Status            Unknown

 

                          Latter-day Affiliation     Unknown

 

                          Race                      Unknown

 

                          Ethnic Group              Unknown





Author





                          Organization              Unknown

 

                          Address                   Unknown

 

                          Phone                     Unavailable



              



Allergies

      



             Active           Description           Code           Type         

  Severity   

                Reaction           Onset           Reported/Identified          

 

Relationship to Patient                 Clinical Status        

 

                Yes             No Known Drug Allergies           [id number]    

       Drug 

Allergy           Unknown           N/A                             2019  

      

                                                             



                  



Medications

      



There is no data.                  



Problems

      



             Date Dx Coded           Attending           Type           Code    

       

Diagnosis                               Diagnosed By        

 

             1400           ROSY BRUNER DO, Ot           Z01.8

18           

ENCOUNTER FOR OTHER PREPROCEDURAL EXAMIN                    

 

             2019           SUNG CHANDLER MD           Ot           K52.

9           

NONINFECTIVE GASTROENTERITIS AND COLITIS                    

 

             2019           SUNG CHANDLER MD           Ot           K59.

00           

CONSTIPATION, UNSPECIFIED                        

 

             2019           SUNG CHANDLER MD           Ot           O16.

1           

UNSPECIFIED MATERNAL HYPERTENSION, FIRST                    

 

                2019           SUNG CHANDLER MD           Ot            

  O99.612          

                          DISEASES OF THE DGSTV SYS COMP PREGNANCY              

      

 

             2019           SUNG CHANDLER MD           Ot           R50.

9           

FEVER, UNSPECIFIED                               

 

             2019           SUNG CHANDLER MD           Ot           Z3A.

14           

14 WEEKS GESTATION OF PREGNANCY                    

 

             2019           SUNG CHANDLER MD           Ot           Z77.

22           

CNTCT W AND EXPSR TO ENVIRON TOBACCO SMO                    

 

                2019           SUNG CHANDLER MD           Ot            

  Z87.891          

                          PERSONAL HISTORY OF NICOTINE DEPENDENCE               

     

 

             2019           SUNG CHANDLER MD           Ot           K52.

9           

NONINFECTIVE GASTROENTERITIS AND COLITIS                    

 

             2019           SUNG CHANDLER MD           Ot           K59.

00           

CONSTIPATION, UNSPECIFIED                        

 

             2019           SUNG CHANDLER MD           Ot           O16.

1           

UNSPECIFIED MATERNAL HYPERTENSION, FIRST                    

 

                2019           SUNG CHANDLER MD           Ot            

  O99.612          

                          DISEASES OF THE DGSTV SYS COMP PREGNANCY              

      

 

             2019           SUNG CHANDLER MD           Ot           R50.

9           

FEVER, UNSPECIFIED                               

 

             2019           SUNG CHANDLER MD           Ot           Z3A.

14           

14 WEEKS GESTATION OF PREGNANCY                    

 

             2019           SUNG CHANDLER MD           Ot           Z77.

22           

CNTCT W AND EXPSR TO ENVIRON TOBACCO SMO                    

 

                2019           ARMESH PERRY, SUNG KEVIN           Ot            

  Z87.891          

                          PERSONAL HISTORY OF NICOTINE DEPENDENCE               

     

 

                04/10/2020           GALI PERSAUD MD           Ot           

   O12.03          

                          GESTATIONAL EDEMA, THIRD TRIMESTER                    

 

             04/10/2020           GALI PERSAUD MD           Ot           O16

.3           

UNSPECIFIED MATERNAL HYPERTENSION, THIRD                    

 

                04/10/2020           GALI PERSAUD MD           Ot           

   Z3A.32          

                          32 WEEKS GESTATION OF PREGNANCY                    

 

                2020           GALI PERSAUD MD           Ot           

   O12.03          

                          GESTATIONAL EDEMA, THIRD TRIMESTER                    

 

             2020           GALI PERSAUD MD           Ot           O16

.3           

UNSPECIFIED MATERNAL HYPERTENSION, THIRD                    

 

                2020           GALI PERSAUD MD           Ot           

   Z3A.32          

                          32 WEEKS GESTATION OF PREGNANCY                    

 

             2020           GALI PERSAUD MD           Ot           O16

.9           

UNSPECIFIED MATERNAL HYPERTENSION, UNSPE                    

 

                2020           GALI PERSAUD MD           Ot           

   Z3A.34          

                          34 WEEKS GESTATION OF PREGNANCY                    

 

                2020           GALI PERSAUD MD           Ot           

   O26.899         

                          OTH PREGNANCY RELATED CONDITIONS, UNSPEC              

      

 

                2020           GALI PERSAUD MD           Ot           

   Z3A.00          

                          WEEKS OF GESTATION OF PREGNANCY NOT SPEC              

      

 

             2020           GALI PERSAUD MD           Ot           O16

.9           

UNSPECIFIED MATERNAL HYPERTENSION, UNSPE                    

 

                2020           GALI PERSAUD MD           Ot           

   Z3A.34          

                          34 WEEKS GESTATION OF PREGNANCY                    

 

                2020           GALI PERSAUD MD           Ot           

   O26.899         

                          OTSUNNY PREGNANCY RELATED CONDITIONS, UNSPEC              

      

 

                2020           GALI PERSAUD MD           Ot           

   Z3A.00          

                          WEEKS OF GESTATION OF PREGNANCY NOT SPEC              

      

 

                2020           GALI PERSAUD MD           Ot           

   O26.899         

                          OTH PREGNANCY RELATED CONDITIONS, UNSPEC              

      

 

                2020           GALI PERSAUD MD           Ot           

   Z3A.00          

                          WEEKS OF GESTATION OF PREGNANCY NOT SPEC              

      

 

                05/15/2020           GALI PERSAUD MD           Ot           

   O10.92          

                          UNSP PRE-EXISTING HYPERTENSION COMPLICAT              

      

 

             05/15/2020           GALI PERSAUD MD           Ot           O62

.2           

OTHER UTERINE INERTIA                            

 

                05/15/2020           GALI PERSAUD MD           Ot           

   O99.824         

                          STREPTOCOCCUS B CARRIER STATE COMPLICATI              

      

 

             05/15/2020           GALI PERSAUD MD, Ot           Z37

.0           

SINGLE LIVE BIRTH                                

 

                05/15/2020           GALI PERSAUD MD, Ot           

   Z3A.37          

                          37 WEEKS GESTATION OF PREGNANCY                    



                                                                                
           



Procedures

      



                Code            Description           Performed By           Per

valdez On        

 

                                      [id number]                                 EX

TRACTION OF PRODUCTS OF 

CONCEPTION, LO                                               2020        



                  



Results

      



                    Test                Result              Range        

 

                                        ESTRADIOL - 19 09:24         

 

                    ESTRADIOL           81 pg/mL            NRG        

 

                                        FSH, SERUM - 19 09:24         

 

                    FSH                 4.4 mIU/mL           NRG        

 

                                        LH - 19 09:24         

 

                    LH                  5.1 mIU/mL           NRG        

 

                                        INSULIN LEVEL - 19 09:24         

 

                    INSULIN             24.0 uIU/mL            2.0-19.6        

 

                                        TESTOSTERONE, TOTAL (WOMEN, CHILDREN, HY

POGONADAL MALES) - 19 10:45       

 

 

                    TESTOSTERONE, TOTAL, LC/MS/MS           15 ng/dL            

2-45        

 

                    FREE TESTOSTERONE           2.6 pg/mL           0.1-6.4     

   

 

                                        LIPID PANEL - 19 09:05         

 

                    CHOLESTEROL, TOTAL           200 mg/dL           <200       

 

 

                    HDL CHOLESTEROL           38 mg/dL            >50        

 

                    TRIGLYCERIDES           124 mg/dL           <150        

 

                    LDL-CHOLESTEROL           137 mg/dL (calc)           NRG    

    

 

                    CHOL/HDLC RATIO           5.3 (calc)           <5.0        

 

                    NON HDL CHOLESTEROL           162 mg/dL (calc)           <13

0        

 

                                        INSULIN LEVEL - 19 09:07         

 

                    INSULIN             15.3 uIU/mL            2.0-19.6        

 

                                        TSH w/ FREE T4 - 19 08:24         

 

                    TSH                 2.06 mIU/L           NRG        

 

                    T4, FREE            1.3 ng/dL           0.8-1.8        

 

                                        CMP - 19 08:24         

 

                    GLUCOSE             99 mg/dL            65-99        

 

                    UREA NITROGEN (BUN)           9 mg/dL             7-25      

  

 

                    CREATININE           0.72 mg/dL           0.50-1.10        

 

                    eGFR NON-AFR. AMERICAN           119 mL/min/1.73m2          

 > OR = 60        

 

                    eGFR            138 mL/min/1.73m2           

> OR = 60        

 

                    BUN/CREATININE RATIO           NOT APPLICABLE (calc)        

   6-22        

 

                    SODIUM              136 mmol/L           135-146        

 

                    POTASSIUM           4.5 mmol/L           3.5-5.3        

 

                    CHLORIDE            104 mmol/L                   

 

                    CARBON DIOXIDE           24 mmol/L           20-32        

 

                    CALCIUM             9.1 mg/dL           8.6-10.2        

 

                    PROTEIN, TOTAL           6.9 g/dL            6.1-8.1        

 

                    ALBUMIN             4.0 g/dL            3.6-5.1        

 

                    GLOBULIN            2.9 g/dL (calc)           1.9-3.7       

 

 

                    ALBUMIN/GLOBULIN RATIO           1.4 (calc)           1.0-2.

5        

 

                    BILIRUBIN, TOTAL           0.3 mg/dL           0.2-1.2      

  

 

                    ALKALINE PHOSPHATASE           62 U/L                 

     

 

                    AST                 14 U/L              10-30        

 

                    ALT                 13 U/L              6-29        

 

                                        INSULIN LEVEL - 19 08:24         

 

                    INSULIN             20.8 uIU/mL            2.0-19.6        

 

                                        ANTIBODY SCREEN - 10/09/19 14:40        

 

 

                          ANTIBODY SCREEN, RBC W/REFL ID, TITER AND AG          

 NO ANTIBODIES DETECTED   

                                        NRG        

 

                                        TSH - 10/09/19 14:40         

 

                    TSH                 1.81 mIU/L           NRG        

 

                                        RUBELLA IMMUNE STATUS - 10/09/19 14:40  

       

 

                    RUBELLA ANTIBODY (IGG)           1.41 index           NRG   

     

 

                                        CULTURE, URINE - 10/09/19 14:40         

 

                    CULTURE, URINE, ROUTINE           SEE NOTE            NRG   

     

 

                                        SUREPATH PAP AND HPV mRNA E6/E7 - 10/09/

19 14:40         

 

                    CLINICAL INFORMATION:                               NRG     

   

 

                    LMP:                                    NRG        

 

                    PREV. PAP:                               NRG        

 

                    PREV. BX:           CX                  NRG        

 

                    SOURCE:             [employer]              NRG        

 

                    STATEMENT OF ADEQUACY:                               NRG    

    

 

                    INTERPRETATION/RESULT:                               NRG    

    

 

                    CYTOTECHNOLOGIST:                               NRG        

 

                    HPV mRNA E6/E7, SUREPATH VIAL           Not Detected        

    NOT DETECTED    

   

 

                    REVIEW CYTOTECHNOLOGIST:                               NRG  

      

 

                    COMMENT                                 NRG        

 

                                        URINE PROTEIN 24 HOUR - 10/15/19 08:20  

       

 

                    PROTEIN, TOTAL, 24 HR UR           156 mg/24 h           <15

0        

 

                                        QNATAL - 19 10:27         

 

                    NUMBER OF FETUSES?           1                   NRG        

 

                    ADVANCED MATERNAL AGE?           NO                  NRG    

    

 

                    ABNORMAL CASSIA?           NO                  NRG        

 

                    ABNORMAL US?           NO                  NRG        

 

                    PERSONAL/FAM HISTORY?           NO                  NRG     

   

 

                    INTERPRETATION           TNP                 NRG        

 

                                        Complete blood count (CBC) with automate

d white blood cell (WBC) differential - 

19 18:40         

 

                          Blood leukocytes automated count (number/volume)      

     8.9 10*3/uL          

                                        4.3-11.0        

 

                          Blood erythrocytes automated count (number/volume)    

       4.14 10*6/uL       

                                        4.35-5.85        

 

                    Venous blood hemoglobin measurement (mass/volume)           

11.4 g/dL           

11.5-16.0        

 

                    Blood hematocrit (volume fraction)           34 %           

     35-52        

 

                    Automated erythrocyte mean corpuscular volume           81 [

foz_us]           

80-99        

 

                                        Automated erythrocyte mean corpuscular h

emoglobin (mass per erythrocyte)        

                          28 pg                     25-34        

 

                                        Automated erythrocyte mean corpuscular h

emoglobin concentration measurement 

(mass/volume)             34 g/dL                   32-36        

 

                    Automated erythrocyte distribution width ratio           13.

9 %              10.0-

14.5        

 

                    Automated blood platelet count (count/volume)           275 

10*3/uL           

130-400        

 

                          Automated blood platelet mean volume measurement      

     10.9 [foz_us]        

                                        7.4-10.4        

 

                    Automated blood neutrophils/100 leukocytes           70 %   

             42-75       

 

 

                    Automated blood lymphocytes/100 leukocytes           23 %   

             12-44       

 

 

                    Blood monocytes/100 leukocytes           7 %                

 0-12        

 

                    Automated blood eosinophils/100 leukocytes           0 %    

             0-10        

 

                    Automated blood basophils/100 leukocytes           0 %      

           0-10        

 

                    Blood neutrophils automated count (number/volume)           

6.2 10*3            

1.8-7.8        

 

                    Blood lymphocytes automated count (number/volume)           

2.0 10*3            

1.0-4.0        

 

                    Blood monocytes automated count (number/volume)           0.

7 10*3            

0.0-1.0        

 

                    Automated eosinophil count           0.0 10*3/uL           0

.0-0.3        

 

                    Automated blood basophil count (count/volume)           0.0 

10*3/uL           

0.0-0.1        

 

                                        Comprehensive metabolic panel - 19

 18:40         

 

                          Serum or plasma sodium measurement (moles/volume)     

      136 mmol/L          

                                        135-145        

 

                          Serum or plasma potassium measurement (moles/volume)  

         3.8 mmol/L       

                                        3.6-5.0        

 

                          Serum or plasma chloride measurement (moles/volume)   

        106 mmol/L        

                                                

 

                    Carbon dioxide           18 mmol/L           21-32        

 

                          Serum or plasma anion gap determination (moles/volume)

           12 mmol/L      

                                        5-14        

 

                          Serum or plasma urea nitrogen measurement (mass/volume

)           4 mg/dL       

                                        7-18        

 

                          Serum or plasma creatinine measurement (mass/volume)  

         0.64 mg/dL       

                                        0.60-1.30        

 

                    Serum or plasma urea nitrogen/creatinine mass ratio         

  6                   NRG  

      

 

                                        Serum or plasma creatinine measurement w

ith calculation of estimated glomerular 

filtration rate           >                         NRG        

 

                    Serum or plasma glucose measurement (mass/volume)           

86 mg/dL            

        

 

                    Serum or plasma calcium measurement (mass/volume)           

9.2 mg/dL           

8.5-10.1        

 

                          Serum or plasma total bilirubin measurement (mass/volu

me)           0.3 mg/dL   

                                        0.1-1.0        

 

                                        Serum or plasma alkaline phosphatase mikael

surement (enzymatic activity/volume)    

                          56 U/L                            

 

                                        Serum or plasma aspartate aminotransfera

se measurement (enzymatic 

activity/volume)           19 U/L                    5-34        

 

                                        Serum or plasma alanine aminotransferase

 measurement (enzymatic activity/volume)

                          18 U/L                    0-55        

 

                    Serum or plasma protein measurement (mass/volume)           

6.9 g/dL            

6.4-8.2        

 

                    Serum or plasma albumin measurement (mass/volume)           

3.7 g/dL            

3.2-4.5        

 

                    CALCIUM CORRECTED           9.4 mg/dL           8.5-10.1    

    

 

                                        Magnesium - 19 18:40         

 

                    Magnesium           1.9 mg/dL           1.6-2.4        

 

                                        Lipase - 19 18:40         

 

                    Lipase              10 U/L              8-78        

 

                                        PENTA SCREEN - 19 15:12         

 

                    Maternal Weight           319 lbs             NRG        

 

                    Est'd Date of Delivery           2020            NRG  

      

 

                    THONG Determined by           ULTRASOUND            NRG       

 

 

                    Mother's Ethnic Origin                       NRG   

     

 

                    Number of Fetuses           1                   NRG        

 

                    Insulin Depend Diabetic           NO                  NRG   

     

 

                    Repeat Specimen           NO                  NRG        

 

                    Hx Of Neural Tube Defects           NO                  NRG 

       

 

                    Prev Pregnancy Down Synd           NO                  NRG  

      

 

                    Donor Egg           NO                  NRG        

 

                    Donor Age: Egg Retrieval           NOT GIVEN            NRG 

       

 

                    Cigarette smoker           NOT GIVEN            NRG        

 

                    INTERPRETATION:           SEE NOTE            NRG        

 

                    Risk for ONTD           <1:5000             NRG        

 

                    Age Risk Down Syndrome           1:1133              NRG    

    

 

                    CASSIA Down Syndrome Risk           <1:5000             <1:270 

       

 

                    CASSIA Trisomy 18 Risk           <1:5000             <1:100    

    

 

                    Calc'd Gestational Age           16.1                NRG    

    

 

                    AFP, Serum           25.8 ng/mL           NRG        

 

                    AFP MoM             1.14                NRG        

 

                    hCG, Serum           11.4 IU/mL           NRG        

 

                    hCG MoM             0.47                NRG        

 

                    Estriol, Free           0.67 ng/mL           NRG        

 

                    Estriol MoM           1.04                NRG        

 

                    Inhibin A, Dimeric           87 pg/mL            NRG        

 

                    Inhibin A MoM           0.67                NRG        

 

                    h-hCG, Serum           7.5 mcg/L           NRG        

 

                    h-hCG MoM           0.37                NRG        

 

                    Date of Birth           1995            NRG        

 

                    Collection Date           2019            NRG        

 

                                        CULTURE, GENITAL - 20 19:00       

  

 

                    CULTURE, GENITAL           SEE NOTE            NRG        

 

                                        LDH - 20 16:04         

 

                    LD                  116 U/L             100-200        

 

                                        URIC ACID, SERUM - 20 16:04       

  

 

                    URIC ACID           5.0 mg/dL           2.5-7.0        

 

                                        GLUCOSE MANUELA 1 HOUR - 20 14:58     

    

 

                    GLUCOSE, POSTPRANDIAL/ 1 HOUR           141 mg/dL           

See Note:        

 

                                        SYPHILIS (RPR W/ REFLEX CONFIRMATION) - 

20 14:58         

 

                          RPR (DX) W/REFL TITER AND CONFIRMATORY TESTING        

   NON-REACTIVE           

                                        NON-REACTIVE        

 

                                        GLUCOSE MANUELA 3 HOUR - 20 14:04     

    

 

                    TIME 1              0830                NRG        

 

                    SPECIMEN 1           86 mg/dL            65-99        

 

                    TIME 2              0930                NRG        

 

                    SPECIMEN 2           165 mg/dL           NRG        

 

                    TIME 3              1030                NRG        

 

                    SPECIMEN 3           109 mg/dL           NRG        

 

                    TIME 4              1130                NRG        

 

                    SPECIMEN 4           56 mg/dL            NRG        

 

                    COMMENT                                 NRG        

 

                                        PROTEIN, TOTAL W/CREAT, RANDOM URINE - 0

3/25/20 14:34         

 

                    CREATININE, RANDOM URINE           110 mg/dL           20-27

5        

 

                    PROTEIN/CREATININE RATIO           127 mg/g creat           

        

 

                    PROTEIN, TOTAL, RANDOM UR           14 mg/dL            5-24

        

 

                                        Urine protein/creatinine mass ratio - 04

/10/20 10:50         

 

                    Urine protein measurement (mass/volume)           11 mg/dL  

          6-12       

 

 

                    Urine creatinine measurement (mass/volume)           64 mg/d

L              

      

 

                    Urine protein/creatinine mass ratio           0.17          

      NRG        

 

                                        Automated blood complete blood count (he

mogram) panel - 04/10/20 10:55         

 

                          Blood leukocytes automated count (number/volume)      

     13.9 10*3/uL         

                                        4.3-11.0        

 

                          Blood erythrocytes automated count (number/volume)    

       3.86 10*6/uL       

                                        4.35-5.85        

 

                    Venous blood hemoglobin measurement (mass/volume)           

10.5 g/dL           

11.5-16.0        

 

                    Blood hematocrit (volume fraction)           32 %           

     35-52        

 

                    Automated erythrocyte mean corpuscular volume           82 [

foz_us]           

80-99        

 

                                        Automated erythrocyte mean corpuscular h

emoglobin (mass per erythrocyte)        

                          27 pg                     25-34        

 

                                        Automated erythrocyte mean corpuscular h

emoglobin concentration measurement 

(mass/volume)             33 g/dL                   32-36        

 

                    Automated erythrocyte distribution width ratio           14.

1 %              10.0-

14.5        

 

                    Automated blood platelet count (count/volume)           281 

10*3/uL           

130-400        

 

                          Automated blood platelet mean volume measurement      

     11.1 [foz_us]        

                                        7.4-10.4        

 

                                        Comprehensive metabolic panel - 04/10/20

 10:55         

 

                          Serum or plasma sodium measurement (moles/volume)     

      135 mmol/L          

                                        135-145        

 

                          Serum or plasma potassium measurement (moles/volume)  

         4.2 mmol/L       

                                        3.6-5.0        

 

                          Serum or plasma chloride measurement (moles/volume)   

        106 mmol/L        

                                                

 

                    Carbon dioxide           18 mmol/L           21-32        

 

                          Serum or plasma anion gap determination (moles/volume)

           11 mmol/L      

                                        5-14        

 

                          Serum or plasma urea nitrogen measurement (mass/volume

)           7 mg/dL       

                                        7-18        

 

                          Serum or plasma creatinine measurement (mass/volume)  

         0.66 mg/dL       

                                        0.60-1.30        

 

                    Serum or plasma urea nitrogen/creatinine mass ratio         

  11                  NRG 

       

 

                                        Serum or plasma creatinine measurement w

ith calculation of estimated glomerular 

filtration rate           >                         NRG        

 

                    Serum or plasma glucose measurement (mass/volume)           

88 mg/dL            

        

 

                    Serum or plasma calcium measurement (mass/volume)           

9.1 mg/dL           

8.5-10.1        

 

                          Serum or plasma total bilirubin measurement (mass/volu

me)           0.1 mg/dL   

                                        0.1-1.0        

 

                                        Serum or plasma alkaline phosphatase mikael

surement (enzymatic activity/volume)    

                          79 U/L                            

 

                                        Serum or plasma aspartate aminotransfera

se measurement (enzymatic 

activity/volume)           8 U/L                     5-34        

 

                                        Serum or plasma alanine aminotransferase

 measurement (enzymatic activity/volume)

                          7 U/L                     0-55        

 

                    Serum or plasma protein measurement (mass/volume)           

6.7 g/dL            

6.4-8.2        

 

                    Serum or plasma albumin measurement (mass/volume)           

3.4 g/dL            

3.2-4.5        

 

                    CALCIUM CORRECTED           9.6 mg/dL           8.5-10.1    

    

 

                                        Serum or plasma uric acid measurement (m

ass/volume) - 04/10/20 10:55         

 

                          Serum or plasma uric acid measurement (mass/volume)   

        4.5 mg/dL         

                                        2.6-7.2        

 

                                        Serum ragweed IgE antibody assay - 04/10

/20 10:55         

 

                    Serum ragweed IgE antibody assay           140 U/L          

   125-220        

 

                                        Urine protein/creatinine mass ratio -  08:50         

 

                    Urine protein measurement (mass/volume)           10 mg/dL  

          6-12       

 

 

                    Urine creatinine measurement (mass/volume)           68 mg/d

L              

      

 

                    Urine protein/creatinine mass ratio           0.15          

      NRG        

 

                                        Complete blood count (CBC) with automate

d white blood cell (WBC) differential - 

20 09:10         

 

                          Blood leukocytes automated count (number/volume)      

     12.0 10*3/uL         

                                        4.3-11.0        

 

                          Blood erythrocytes automated count (number/volume)    

       3.81 10*6/uL       

                                        4.35-5.85        

 

                    Venous blood hemoglobin measurement (mass/volume)           

10.5 g/dL           

11.5-16.0        

 

                    Blood hematocrit (volume fraction)           31 %           

     35-52        

 

                    Automated erythrocyte mean corpuscular volume           82 [

foz_us]           

80-99        

 

                                        Automated erythrocyte mean corpuscular h

emoglobin (mass per erythrocyte)        

                          28 pg                     25-34        

 

                                        Automated erythrocyte mean corpuscular h

emoglobin concentration measurement 

(mass/volume)             33 g/dL                   32-36        

 

                    Automated erythrocyte distribution width ratio           14.

6 %              10.0-

14.5        

 

                    Automated blood platelet count (count/volume)           284 

10*3/uL           

130-400        

 

                          Automated blood platelet mean volume measurement      

     11.1 [foz_us]        

                                        7.4-10.4        

 

                    Automated blood neutrophils/100 leukocytes           79 %   

             42-75       

 

 

                    Automated blood lymphocytes/100 leukocytes           16 %   

             12-44       

 

 

                    Blood monocytes/100 leukocytes           5 %                

 0-12        

 

                    Automated blood eosinophils/100 leukocytes           1 %    

             0-10        

 

                    Automated blood basophils/100 leukocytes           0 %      

           0-10        

 

                    Blood neutrophils automated count (number/volume)           

9.5 10*3            

1.8-7.8        

 

                    Blood lymphocytes automated count (number/volume)           

1.9 10*3            

1.0-4.0        

 

                    Blood monocytes automated count (number/volume)           0.

6 10*3            

0.0-1.0        

 

                    Automated eosinophil count           0.1 10*3/uL           0

.0-0.3        

 

                    Automated blood basophil count (count/volume)           0.0 

10*3/uL           

0.0-0.1        

 

                                        Comprehensive metabolic panel - 20

 09:10         

 

                          Serum or plasma sodium measurement (moles/volume)     

      136 mmol/L          

                                        135-145        

 

                          Serum or plasma potassium measurement (moles/volume)  

         4.1 mmol/L       

                                        3.6-5.0        

 

                          Serum or plasma chloride measurement (moles/volume)   

        106 mmol/L        

                                                

 

                    Carbon dioxide           17 mmol/L           21-32        

 

                          Serum or plasma anion gap determination (moles/volume)

           13 mmol/L      

                                        5-14        

 

                          Serum or plasma urea nitrogen measurement (mass/volume

)           6 mg/dL       

                                        7-18        

 

                          Serum or plasma creatinine measurement (mass/volume)  

         0.67 mg/dL       

                                        0.60-1.30        

 

                    Serum or plasma urea nitrogen/creatinine mass ratio         

  9                   NRG  

      

 

                                        Serum or plasma creatinine measurement w

ith calculation of estimated glomerular 

filtration rate           >                         NRG        

 

                    Serum or plasma glucose measurement (mass/volume)           

103 mg/dL           

        

 

                    Serum or plasma calcium measurement (mass/volume)           

9.0 mg/dL           

8.5-10.1        

 

                          Serum or plasma total bilirubin measurement (mass/volu

me)           0.2 mg/dL   

                                        0.1-1.0        

 

                                        Serum or plasma alkaline phosphatase mikael

surement (enzymatic activity/volume)    

                          78 U/L                            

 

                                        Serum or plasma aspartate aminotransfera

se measurement (enzymatic 

activity/volume)           11 U/L                    5-34        

 

                                        Serum or plasma alanine aminotransferase

 measurement (enzymatic activity/volume)

                          9 U/L                     0-55        

 

                    Serum or plasma protein measurement (mass/volume)           

6.7 g/dL            

6.4-8.2        

 

                    Serum or plasma albumin measurement (mass/volume)           

3.2 g/dL            

3.2-4.5        

 

                    CALCIUM CORRECTED           9.6 mg/dL           8.5-10.1    

    

 

                                        Serum or plasma uric acid measurement (m

ass/volume) - 20 09:10         

 

                          Serum or plasma uric acid measurement (mass/volume)   

        5.1 mg/dL         

                                        2.6-7.2        

 

                                        Serum ragweed IgE antibody assay -  09:10         

 

                    Serum ragweed IgE antibody assay           156 U/L          

   125-220        

 

                                        [id number] - 20 09:10         

 

                    [id number]             SPECIMEN AVAILABLE            NRG       

 

 

                                        Urine protein/creatinine mass ratio -  11:40         

 

                    Urine protein measurement (mass/volume)           9 mg/dL   

          6-12        

 

                    Urine creatinine measurement (mass/volume)           71 mg/d

L              

      

 

                    Urine protein/creatinine mass ratio           0.13          

      NRG        

 

                                        Blood CBC with ordered manual differenti

al panel - 20 12:05         

 

                          Blood leukocytes automated count (number/volume)      

     15.9 10*3/uL         

                                        4.3-11.0        

 

                          Blood erythrocytes automated count (number/volume)    

       3.79 10*6/uL       

                                        4.35-5.85        

 

                    Venous blood hemoglobin measurement (mass/volume)           

10.4 g/dL           

11.5-16.0        

 

                    Blood hematocrit (volume fraction)           31 %           

     35-52        

 

                    Automated erythrocyte mean corpuscular volume           82 [

foz_us]           

80-99        

 

                                        Automated erythrocyte mean corpuscular h

emoglobin (mass per erythrocyte)        

                          27 pg                     25-34        

 

                                        Automated erythrocyte mean corpuscular h

emoglobin concentration measurement 

(mass/volume)             33 g/dL                   32-36        

 

                    Automated erythrocyte distribution width ratio           14.

4 %              10.0-

14.5        

 

                    Automated blood platelet count (count/volume)           290 

10*3/uL           

130-400        

 

                          Automated blood platelet mean volume measurement      

     11.1 [foz_us]        

                                        7.4-10.4        

 

                    Automated blood neutrophils/100 leukocytes           75 %   

             42-75       

 

 

                    Automated blood lymphocytes/100 leukocytes           19 %   

             12-44       

 

 

                    Blood monocytes/100 leukocytes           3 %                

 NRG        

 

                    Automated blood eosinophils/100 leukocytes           0 %    

             0-10        

 

                    Automated blood basophils/100 leukocytes           0 %      

           0-10        

 

                    Blood neutrophils automated count (number/volume)           

12.0 10*3           

1.8-7.8        

 

                    Blood lymphocytes automated count (number/volume)           

3.0 10*3            

1.0-4.0        

 

                    Blood monocytes automated count (number/volume)           0.

9 10*3            

0.0-1.0        

 

                    Automated eosinophil count           0.0 10*3/uL           0

.0-0.3        

 

                    Automated blood basophil count (count/volume)           0.0 

10*3/uL           

0.0-0.1        

 

                    Manual blood segmented neutrophils/100 leukocytes           

74 %                NRG  

      

 

                    Blood band neutrophils/100 leukocytes           2 %         

        NRG        

 

                    Manual blood lymphocytes/100 leukocytes           20 %      

          NRG        

 

                    Blood erythrocyte morphology finding identification         

  NORMAL              

NRG        

 

                    Manual blood metamyelocytes/100 leukocytes           1 %    

             NRG        

 

                                        Comprehensive metabolic panel - 20

 12:05         

 

                          Serum or plasma sodium measurement (moles/volume)     

      135 mmol/L          

                                        135-145        

 

                          Serum or plasma potassium measurement (moles/volume)  

         3.9 mmol/L       

                                        3.6-5.0        

 

                          Serum or plasma chloride measurement (moles/volume)   

        106 mmol/L        

                                                

 

                    Carbon dioxide           17 mmol/L           21-32        

 

                          Serum or plasma anion gap determination (moles/volume)

           12 mmol/L      

                                        5-14        

 

                          Serum or plasma urea nitrogen measurement (mass/volume

)           6 mg/dL       

                                        7-18        

 

                          Serum or plasma creatinine measurement (mass/volume)  

         0.64 mg/dL       

                                        0.60-1.30        

 

                    Serum or plasma urea nitrogen/creatinine mass ratio         

  9                   NRG  

      

 

                                        Serum or plasma creatinine measurement w

ith calculation of estimated glomerular 

filtration rate           >                         NRG        

 

                    Serum or plasma glucose measurement (mass/volume)           

86 mg/dL            

        

 

                    Serum or plasma calcium measurement (mass/volume)           

9.1 mg/dL           

8.5-10.1        

 

                          Serum or plasma total bilirubin measurement (mass/volu

me)           0.2 mg/dL   

                                        0.1-1.0        

 

                                        Serum or plasma alkaline phosphatase mikael

surement (enzymatic activity/volume)    

                          85 U/L                            

 

                                        Serum or plasma aspartate aminotransfera

se measurement (enzymatic 

activity/volume)           9 U/L                     5-34        

 

                                        Serum or plasma alanine aminotransferase

 measurement (enzymatic activity/volume)

                          10 U/L                    0-55        

 

                    Serum or plasma protein measurement (mass/volume)           

6.8 g/dL            

6.4-8.2        

 

                    Serum or plasma albumin measurement (mass/volume)           

3.2 g/dL            

3.2-4.5        

 

                    CALCIUM CORRECTED           9.7 mg/dL           8.5-10.1    

    

 

                                        Serum or plasma uric acid measurement (m

ass/volume) - 20 12:05         

 

                          Serum or plasma uric acid measurement (mass/volume)   

        5.0 mg/dL         

                                        2.6-7.2        

 

                                        Serum ragweed IgE antibody assay -  12:05         

 

                    Serum ragweed IgE antibody assay           141 U/L          

   125-220        

 

                                        CMP - 20 13:42         

 

                    GLUCOSE             76 mg/dL                    

 

                    UREA NITROGEN (BUN)           7 mg/dL             7-25      

  

 

                    CREATININE           0.58 mg/dL           0.50-1.10        

 

                    eGFR NON-AFR. AMERICAN           131 mL/min/1.73m2          

 > OR = 60        

 

                    eGFR            152 mL/min/1.73m2           

> OR = 60        

 

                    BUN/CREATININE RATIO           NOT APPLICABLE (calc)        

   6-22        

 

                    SODIUM              136 mmol/L           135-146        

 

                    POTASSIUM           4.5 mmol/L           3.5-5.3        

 

                    CHLORIDE            102 mmol/L                   

 

                    CARBON DIOXIDE           19 mmol/L           20-32        

 

                    CALCIUM             9.3 mg/dL           8.6-10.2        

 

                    PROTEIN, TOTAL           6.2 g/dL            6.1-8.1        

 

                    ALBUMIN             3.2 g/dL            3.6-5.1        

 

                    GLOBULIN            3.0 g/dL (calc)           1.9-3.7       

 

 

                    ALBUMIN/GLOBULIN RATIO           1.1 (calc)           1.0-2.

5        

 

                    BILIRUBIN, TOTAL           0.2 mg/dL           0.2-1.2      

  

 

                    ALKALINE PHOSPHATASE           97 U/L                 

     

 

                    AST                 8 U/L               10-30        

 

                    ALT                 6 U/L               6-29        

 

                                        LDH - 20 13:42         

 

                    LD                  143 U/L             100-200        

 

                                        URIC ACID, SERUM - 20 13:42       

  

 

                    URIC ACID           5.3 mg/dL           2.5-7.0        

 

                                        GLUCOSE MANUELA 3 HOUR - 20 13:42     

    

 

                    TIME 1              NG                  NRG        

 

                    SPECIMEN 1           77 mg/dL            65-99        

 

                    TIME 2              NG                  NRG        

 

                    SPECIMEN 2           172 mg/dL           NRG        

 

                    TIME 3              NG                  NRG        

 

                    SPECIMEN 3           117 mg/dL           NRG        

 

                    TIME 4              NG                  NRG        

 

                    SPECIMEN 4           65 mg/dL            NRG        

 

                    COMMENT                                 NRG        

 

                                        PROTEIN, TOTAL W/CREAT, RANDOM URINE - 0

20 13:42         

 

                    CREATININE, RANDOM URINE           59 mg/dL            

5        

 

                    PROTEIN/CREATININE RATIO           203 mg/g creat           

        

 

                    PROTEIN, TOTAL, RANDOM UR           12 mg/dL            -24

        

 

                                        CBC - 20 13:42         

 

                    WHITE BLOOD CELL COUNT           13.5 Thousand/uL           

3.8-10.8        

 

                    RED BLOOD CELL COUNT           4.00 Million/uL           3.8

0-5.10        

 

                    HEMOGLOBIN           10.9 g/dL           11.7-15.5        

 

                    HEMATOCRIT           33.3 %              35.0-45.0        

 

                    MCV                 83.3 fL             80.0-100.0        

 

                    MCH                 27.3 pg             27.0-33.0        

 

                    MCHC                32.7 g/dL           32.0-36.0        

 

                    RDW                 13.8 %              11.0-15.0        

 

                    PLATELET COUNT           290 Thousand/uL           140-400  

      

 

                    MPV                 11.1 fL             7.5-12.5        

 

                    ABSOLUTE NEUTROPHILS           98446 cells/uL           1500

-7800        

 

                    ABSOLUTE LYMPHOCYTES           2255 cells/uL           850-3

900        

 

                    ABSOLUTE MONOCYTES           527 cells/uL           200-950 

       

 

                    ABSOLUTE EOSINOPHILS           54 cells/uL            

       

 

                    ABSOLUTE BASOPHILS           14 cells/uL           0-200    

    

 

                    NEUTROPHILS           78.9 %              NRG        

 

                    LYMPHOCYTES           16.7 %              NRG        

 

                    MONOCYTES           3.9 %               NRG        

 

                    EOSINOPHILS           0.4 %               NRG        

 

                    BASOPHILS           0.1 %               NRG        

 

                                        URIC ACID, SERUM - 20 11:56       

  

 

                    URIC ACID           5.6 mg/dL           2.5-7.0        

 

                                        PROTEIN, TOTAL W/CREAT, RANDOM URINE - 0

20 11:56         

 

                    CREATININE, RANDOM URINE           116 mg/dL           

5        

 

                    PROTEIN/CREATININE RATIO           190 mg/g creat           

        

 

                    PROTEIN, TOTAL, RANDOM UR           22 mg/dL            -24

        

 

                                        CBC - 20 11:56         

 

                    WHITE BLOOD CELL COUNT           15.3 Thousand/uL           

3.8-10.8        

 

                    RED BLOOD CELL COUNT           4.21 Million/uL           3.8

0-5.10        

 

                    HEMOGLOBIN           11.4 g/dL           11.7-15.5        

 

                    HEMATOCRIT           35.2 %              35.0-45.0        

 

                    MCV                 83.6 fL             80.0-100.0        

 

                    MCH                 27.1 pg             27.0-33.0        

 

                    MCHC                32.4 g/dL           32.0-36.0        

 

                    RDW                 13.9 %              11.0-15.0        

 

                    PLATELET COUNT           316 Thousand/uL           140-400  

      

 

                    MPV                 10.8 fL             7.5-12.5        

 

                    ABSOLUTE NEUTROPHILS           44436 cells/uL           1500

-7800        

 

                    ABSOLUTE LYMPHOCYTES           2509 cells/uL           850-3

900        

 

                    ABSOLUTE MONOCYTES           673 cells/uL           200-950 

       

 

                    ABSOLUTE EOSINOPHILS           46 cells/uL            

       

 

                    ABSOLUTE BASOPHILS           31 cells/uL           0-200    

    

 

                    NEUTROPHILS           78.7 %              NRG        

 

                    LYMPHOCYTES           16.4 %              NRG        

 

                    MONOCYTES           4.4 %               NRG        

 

                    EOSINOPHILS           0.3 %               NRG        

 

                    BASOPHILS           0.2 %               NRG        

 

                                        CULTURE, GROUP B STREP (VAGINAL) -  11:56         

 

                    STREPTOCOCCUS, GROUP B CULTURE           SEE NOTE           

 NRG        

 

                                        Urine protein/creatinine mass ratio -  19:00         

 

                    Urine protein measurement (mass/volume)           24 mg/dL  

          6-12       

 

 

                    Urine creatinine measurement (mass/volume)           149 mg/

dL            

       

 

                    Urine protein/creatinine mass ratio           0.16          

      NRG        

 

                                        Complete blood count (CBC) with automate

d white blood cell (WBC) differential - 

20 19:30         

 

                          Blood leukocytes automated count (number/volume)      

     12.1 10*3/uL         

                                        4.3-11.0        

 

                          Blood erythrocytes automated count (number/volume)    

       4.19 10*6/uL       

                                        4.35-5.85        

 

                    Venous blood hemoglobin measurement (mass/volume)           

11.3 g/dL           

11.5-16.0        

 

                    Blood hematocrit (volume fraction)           34 %           

     35-52        

 

                    Automated erythrocyte mean corpuscular volume           81 [

foz_us]           

80-99        

 

                                        Automated erythrocyte mean corpuscular h

emoglobin (mass per erythrocyte)        

                          27 pg                     25-34        

 

                                        Automated erythrocyte mean corpuscular h

emoglobin concentration measurement 

(mass/volume)             33 g/dL                   32-36        

 

                    Automated erythrocyte distribution width ratio           14.

6 %              10.0-

14.5        

 

                    Automated blood platelet count (count/volume)           295 

10*3/uL           

130-400        

 

                          Automated blood platelet mean volume measurement      

     11.8 [foz_us]        

                                        7.4-10.4        

 

                    Automated blood neutrophils/100 leukocytes           76 %   

             42-75       

 

 

                    Automated blood lymphocytes/100 leukocytes           18 %   

             12-44       

 

 

                    Blood monocytes/100 leukocytes           6 %                

 0-12        

 

                    Automated blood eosinophils/100 leukocytes           0 %    

             0-10        

 

                    Automated blood basophils/100 leukocytes           0 %      

           0-10        

 

                    Blood neutrophils automated count (number/volume)           

9.2 10*3            

1.8-7.8        

 

                    Blood lymphocytes automated count (number/volume)           

2.2 10*3            

1.0-4.0        

 

                    Blood monocytes automated count (number/volume)           0.

7 10*3            

0.0-1.0        

 

                    Automated eosinophil count           0.0 10*3/uL           0

.0-0.3        

 

                    Automated blood basophil count (count/volume)           0.0 

10*3/uL           

0.0-0.1        

 

                                        Blood type T Indirect antibody screen pa

elia - 20 19:30         

 

                    WRISTBAND NUMBER           [id number]             NRG        

 

                    ABO+Rh group           AP                  NRG        

 

                    Blood group antibody screen           NEGATIVE            NR

G        

 

                                        Comprehensive metabolic panel - 20

 19:30         

 

                          Serum or plasma sodium measurement (moles/volume)     

      137 mmol/L          

                                        135-145        

 

                          Serum or plasma potassium measurement (moles/volume)  

         4.7 mmol/L       

                                        3.6-5.0        

 

                          Serum or plasma chloride measurement (moles/volume)   

        106 mmol/L        

                                                

 

                    Carbon dioxide           16 mmol/L           21-32        

 

                          Serum or plasma anion gap determination (moles/volume)

           15 mmol/L      

                                        5-14        

 

                          Serum or plasma urea nitrogen measurement (mass/volume

)           8 mg/dL       

                                        7-18        

 

                          Serum or plasma creatinine measurement (mass/volume)  

         0.73 mg/dL       

                                        0.60-1.30        

 

                    Serum or plasma urea nitrogen/creatinine mass ratio         

  11                  NRG 

       

 

                                        Serum or plasma creatinine measurement w

ith calculation of estimated glomerular 

filtration rate           >                         NRG        

 

                    Serum or plasma glucose measurement (mass/volume)           

116 mg/dL           

        

 

                    Serum or plasma calcium measurement (mass/volume)           

9.7 mg/dL           

8.5-10.1        

 

                          Serum or plasma total bilirubin measurement (mass/volu

me)           0.1 mg/dL   

                                        0.1-1.0        

 

                                        Serum or plasma alkaline phosphatase mikael

surement (enzymatic activity/volume)    

                          105 U/L                           

 

                                        Serum or plasma aspartate aminotransfera

se measurement (enzymatic 

activity/volume)           15 U/L                    5-34        

 

                                        Serum or plasma alanine aminotransferase

 measurement (enzymatic activity/volume)

                          7 U/L                     0-55        

 

                    Serum or plasma protein measurement (mass/volume)           

7.0 g/dL            

6.4-8.2        

 

                    Serum or plasma albumin measurement (mass/volume)           

3.2 g/dL            

3.2-4.5        

 

                    CALCIUM CORRECTED           10.3 mg/dL           8.5-10.1   

     

 

                                        Serum or plasma uric acid measurement (m

ass/volume) - 20 19:30         

 

                          Serum or plasma uric acid measurement (mass/volume)   

        6.0 mg/dL         

                                        2.6-7.2        

 

                                        Serum ragweed IgE antibody assay -  19:30         

 

                    Serum ragweed IgE antibody assay           267 U/L          

   125-220        

 

                                        Serum reagin antibody assay (units/volum

e) by [facility] - 20 19:30         

 

                          Serum reagin antibody assay (units/volume) by [facility]     

      Non-Reactive        

                                        Non-Reactive        



                                                                                
                                                          



Encounters

      



                ACCT No.           Visit Date/Time           Discharge          

 Status         

             Pt. Type           Provider           Facility           Loc./Unit 

          

Complaint        

 

                [id number]           2020 13:00:00           2020 23:59:

59           [facility]

                Outpatient           HUNG PERRY, GALI LOMAS

 [address]                                    

 

             [id number]           2020 11:00:00                              

       Document

 Registration                                                                   

 

 

             [id number]           2020 10:20:00                              

       Document

 Registration                                                                   

 

 

             [id number]           2020 11:20:00                              

       Document

 Registration                                                                   

 

 

             [id number]           2020 14:00:00                              

       Document

 Registration                                                                   

 

 

             [id number]           2020 15:00:00                              

       Document

 Registration                                                                   

 

 

             [id number]           2020 15:40:00                              

       Document

 Registration                                                                   

 

 

             [id number]           2020 13:20:00                              

       Document

 Registration                                                                   

 

 

             [id number]           2019 13:20:00                              

       Document

 Registration                                                                   

 

 

             [id number]           2019 09:00:00                              

       Document

 Registration                                                                   

 

 

             [id number]           10/15/2019 08:20:00                              

       Document

 Registration                                                                   

 

 

             [id number]           10/09/2019 13:20:00                              

       Document

 Registration                                                                   

 

 

             [id number]           2019 09:20:00                              

       Document

 Registration                                                                   

 

 

             [id number]           2019 09:00:00                              

       Document

 Registration                                                                   

 

 

             [id number]           2019 10:40:00                              

       Document

 Registration                                                                   

 

 

             [id number]           2019 08:20:00                              

       Document

 Registration                                                                   

 

 

                    [id number]           2020 05:35:00            14:01:00        

                DIS             Outpatient           ROSY BRUNER DO          

 [facility]           PREOP                     CHOLELITHIASIS        

 

                    [id number]           2020 18:27:00           05/15/2

020 16:33:00        

                DIS             Inpatient           GALI PERSAUD MD         

  [facility]           LDRP                      INDUCTION        

 

                    [id number]           2020 10:55:00            13:05:00        

                DIS             Outpatient           GALI PERSAUD MD        

   [facility]                       HIGH BP        

 

                    [id number]           2020 08:39:00            13:15:00        

                DIS             Outpatient           GALI PERSAUD MD        

   [facility]                       HIGH BP        

 

                    [id number]           04/10/2020 10:30:00           04/10/2

020 13:23:00        

                DIS             Outpatient           GALI PERSAUD MD        

   [facility]                       HIGH BLOOD PRESSURE    

    

 

                    [id number]           2019 17:13:00            19:34:00        

                DIS             Emergency           SUNG CHANDLER MD          

 [facility]           ER                        CONSTIPATION,VOMITING, 

FEVER        

 

             [id number]           2020 08:00:00                        P

EN           

Preadmit                  ROSY BRUNER DO           [facility]                       CHOLELITHIASIS

## 2020-08-12 NOTE — NUR
HAS BEEN UP TO BR TO VOID, GAIT STEADY. REPORTS NAUSEA "IS GETTING BETTER".  LORTAB 10/325 
MG, ONE TAB, GIVEN PO.

## 2020-08-12 NOTE — PROGRESS NOTE-PRE OPERATIVE
Pre-Operative Progress Note


H&P Reviewed


The H&P was reviewed, patient examined and no changes noted.


Time Seen by Provider:  07:56


Date H&P Reviewed:  Aug 12, 2020


Time H&P Reviewed:  07:57


Pre-Operative Diagnosis:  Cholelithiasis/Cholecystitis











ROSY BRUNER DO               Aug 12, 2020 08:03

## 2020-08-12 NOTE — DISCHARGE INST-SURGICAL
Discharge Inst-Surgical


Depart Medication/Instructions


New, Converted or Re-Newed RX:  RX Given to Pt/Family


Patient Instructions


Follow up Appt:


Make appointment for 1 week. [phone]





Instructions:


No lifting greater than 20 pounds.


No strenuous activity. 


May shower in 24 hours, no tub bath or soaking.


Use incentive spirometer at home as directed.


No Smoking





Skin/Wound Care:


May remove bandages in am.  You need to leave the Dermabond on incision it will 

fall off on it's own. 





Symptoms to Report:


Appetite Changes, Extremity Discoloration, Numbness/Tingling, Swelling 

Increased, Bleeding Excessive, Eyesight Changes, Pain Increased, Urine Color 

Change, Constipation(Persistent), Fever over 101 degree F, Pain/Pressure in 

chest, Urinating Difficulty, Cough Up/Vomit Blood, Heart Beat Irreg/Pounding, 

Pain/Pressure in jaw, Cramps in feet or legs, Lightheadedness, Pain/Pressure in 

shoulder, Diarrhea(Persistent), Memory Changes Suddenly, Questions/Concerns, 

Weight gain consecutive days, Dizziness/Fainting, Nausea/Vomiting, Shortness of 

Breath, Weight gain over 2 pounds








If questions or concerns contact your physician 


Or seek help at emergency department.





Activity


Activity as Tolerated:  Yes


Activity Instructions:  Avoid Stress to Incision


Driving Instructions:  No Driving/Refer to 





Diet


Discharge Diet:  Avoid Fatty Foods, Low Fat/Low Cholesterol


Diet After 24 Hours:  Clear Liquid if Nauseous


If Any Problems/Questions/Issu:  Contact Your Physician, Go to Emergency Room





Skin/Wound Care


Infection Signs and Symptoms:  Increased Redness, Foul Odor of Wound, Increased 

Drainage, Skin Itchy or Has a Rash, Increased Swelling, Temperature Above 101  F


Wound Care Comment:  


Heating pad to shoulder or neck tonight for pain


Bathing Instructions:  Shower


Ice Pack:  Ice On and Off Site (at incisions as needed for pain)











ROSY BRUNER DO               Aug 12, 2020 08:53

## 2020-08-12 NOTE — DIAGNOSTIC IMAGING REPORT
INDICATION: Laparoscopic cholecystectomy with cholangiogram.



COMPARISON: None



Total fluoroscopy time: 14 seconds



Total number of fluoroscopic images saved: 38



FINDINGS: Multiple intraoperative image intensifier and digital

subtraction images of the right upper abdominal quadrant were

obtained during intraoperative cholangiogram. Images provided

show contrast filling the intra and extrahepatic biliary ductal

systems. There is small amount of extravasation of contrast.

There is normal migration of contrast into the small bowel. No

distinct intraluminal filling defects are seen. Please note,

however, that interpreting radiologist was not present during the

procedure.



IMPRESSION:

1. Fluoroscopic guidance provided during intraoperative

cholangiogram as above.



Dictated by: 



  Dictated on workstation # [id number]

## 2020-08-12 NOTE — PROGRESS NOTE-POST OPERATIVE
Post-Operative Progess Note


Surgeon (s)/Assistant (s)


Surgeon


ROSY BRUNER DO


Assistant:  Amada





Pre-Operative Diagnosis


Cholelithiasis/Cholecystitis





Post-Operative Diagnosis





same





Procedure & Operative Findings


Date of Procedure


8/12/20


Procedure Performed/Findings


  


PROCEDURE:


Laparoscopic cholecystectomy with intraoperative


cholangiogram. 


 


COMPLICATIONS:


None. 


 


PROCEDURE:


The patient was taken to the operating suite and was prepped and


draped in sterile fashion. A surgical pause was performed. Just


superior to the umbilicus, a 12 mm incision was made. Dissection


was taken down to the fascia, which was then scored and grasped


with a Kocher and the abdomen was then entered.  A 0 Vicryl


suture was placed in a figure-of-eight fashion and a Noriega


trocar was placed and secured. Pneumoperitoneum was achieved.


A 5mm trochar place in the subxyphoid and 2 in the right upper quadrant.


The gallbladder was then grasped and elevated. The


cystic duct, and cystic artery were then 


dissected out. Clip was placed on the distal


portion of the cystic duct which was then partially transected.


An arrow catheter was inserted into the duct. 


The cholangiogram was then performed. No filing defects and contrast


made its way into the duodenum.  Catheter removed. Clips were placed


on proximal portion of the cystic duct and then the duct was then


transected. Clips were placed along the proximal and distal


portion of the cystic artery which was then transected. Hook


cautery was used to dissect the gallbladder from the gallbladder


fossa achieving hemostasis. The gallbladder was placed in an


Endobag and removed through the 12 mm trocar site. The abdomen


was then reinspected.  Copious amounts of irrigation were used to


irrigate the abdomen and there were no signs of active bleeding.


Hemostasis had been achieved. The 12 mm fascial defect was then


closed with 0 Vicryl suture that had been placed in a


figure-of-eight fashion. The abdomen was then desufflated, the


trocars were removed. The abdomen was then washed and dried. The


skin was then closed using 4-0 Monocryl in a subcuticular fashion.


The abdomen was washed and dried and Skin Affix was place over incisions.


Patient tolerated the procedure well without any complications 


and was taken to the recovery room in stable condition.


Anesthesia Type


GET





Estimated Blood Loss


Estimated blood loss (mL):  scant





Specimens/Packing


Specimens Removed


GB and contents











ROSY BRUNER DO               Aug 12, 2020 08:50

## 2020-08-13 NOTE — ANESTHESIA-GENERAL POST-OP
General


Patient Condition


Mental Status/LOC:  Same as Preop


Cardiovascular:  Satisfactory


Nausea/Vomiting:  Absent


Respiratory:  Satisfactory


Pain:  Controlled


Complications:  Absent





Post Op Complications


Complications


None





Follow Up Care/Instructions


Patient Instructions


None needed.





Anesthesia/Patient Condition


Patient Condition


Patient is doing well, no complaints, stable vital signs, no apparent adverse 

anesthesia problems.   


No complications reported per nursing.











DEBORAH MARTIN CRNA          Aug 13, 2020 07:14

## 2022-08-28 NOTE — DIAGNOSTIC IMAGING REPORT
PROCEDURE: US Hepatic (Liver).



TECHNIQUE: Multiple real-time grayscale images were obtained over

the right upper quadrant in various projections.



INDICATION: Right upper quadrant abdominal pain, hypertension.   



COMPARISON: None.



FINDINGS:



The size and echogenicity liver appears normal. There is no mass

or intrahepatic biliary duct dilatation. Portal venous flow is

normal. Common bile duct, pancreas, aorta and IVC are obscured by

bowel gas. The right kidney is normal. Several gallstones are

present. Otherwise, gallbladder wall and lumen are unremarkable.

There is no inflammation.



IMPRESSION:

1. Normal-appearing liver.

2. Cholelithiasis without cholecystitis

3. Limited examination due to overlying bowel gas. No obvious

ascites.



Dictated by: 



  Dictated on workstation # [id number] No